# Patient Record
Sex: FEMALE | Race: WHITE | HISPANIC OR LATINO | Employment: UNEMPLOYED | URBAN - METROPOLITAN AREA
[De-identification: names, ages, dates, MRNs, and addresses within clinical notes are randomized per-mention and may not be internally consistent; named-entity substitution may affect disease eponyms.]

---

## 2018-10-22 ENCOUNTER — HOSPITAL ENCOUNTER (EMERGENCY)
Facility: OTHER | Age: 33
Discharge: HOME | End: 2018-10-22
Attending: EMERGENCY MEDICINE
Payer: MEDICAID

## 2018-10-22 VITALS
DIASTOLIC BLOOD PRESSURE: 78 MMHG | HEART RATE: 74 BPM | OXYGEN SATURATION: 98 % | WEIGHT: 151.69 LBS | RESPIRATION RATE: 18 BRPM | TEMPERATURE: 98 F | SYSTOLIC BLOOD PRESSURE: 115 MMHG

## 2018-10-22 DIAGNOSIS — Z34.90 PREGNANCY, UNSPECIFIED GESTATIONAL AGE: Primary | ICD-10-CM

## 2018-10-22 LAB
B-HCG UR QL: POSITIVE
BILIRUB UR QL STRIP: NEGATIVE
CLARITY UR: CLEAR
COLOR UR: YELLOW
CTP QC/QA: YES
GLUCOSE UR QL STRIP: NEGATIVE
HGB UR QL STRIP: ABNORMAL
KETONES UR QL STRIP: NEGATIVE
LEUKOCYTE ESTERASE UR QL STRIP: NEGATIVE
NITRITE UR QL STRIP: NEGATIVE
PH UR STRIP: 7 [PH] (ref 5–8)
PROT UR QL STRIP: NEGATIVE
SP GR UR STRIP: 1.01 (ref 1–1.03)
URN SPEC COLLECT METH UR: ABNORMAL
UROBILINOGEN UR STRIP-ACNC: NEGATIVE EU/DL

## 2018-10-22 PROCEDURE — 99283 EMERGENCY DEPT VISIT LOW MDM: CPT | Mod: 25

## 2018-10-22 PROCEDURE — 81003 URINALYSIS AUTO W/O SCOPE: CPT

## 2018-10-22 PROCEDURE — 81025 URINE PREGNANCY TEST: CPT | Performed by: EMERGENCY MEDICINE

## 2018-10-22 NOTE — DISCHARGE INSTRUCTIONS
Maternal and Fetal Medicine (high risk)   [facility]   MD   [address] [place]  [address]   Ph: [phone]

## 2018-10-22 NOTE — ED PROVIDER NOTES
"Encounter Date: 10/22/2018    SCRIBE #1 NOTE: I, Noemi Valiente, am scribing for, and in the presence of, Dr. Ureña.       History     Chief Complaint   Patient presents with    wellness check      reports that she is 3 months pregnant and "came to make sure the baby is ok." They deny any problems with the pregnancy. She has not seen an OBGYN for this pregnancy and they did not understand that they needed to make an initial appt with OB  for prenatal care.      Time seen by provider: 4:30 PM    This is a 33 y.o. female who presents for wellness check. She reports she is three months pregnant. She reports intermittent leg swelling. She denies abdominal pain, vaginal bleeding, or dysuria. This is her third pregnancy. She has two children that were born in [place]. LMP was . She does not have an appointment scheduled with OB.      The history is provided by the patient and the spouse. The history is limited by a language barrier.     Review of patient's allergies indicates:  No Known Allergies  History reviewed. No pertinent past medical history.  Past Surgical History:   Procedure Laterality Date     SECTION      x2     History reviewed. No pertinent family history.  Social History     Tobacco Use    Smoking status: Never Smoker    Smokeless tobacco: Never Used   Substance Use Topics    Alcohol use: No     Frequency: Never    Drug use: No     Review of Systems   Constitutional: Negative for fever.   HENT: Negative for sore throat.    Respiratory: Negative for shortness of breath.    Cardiovascular: Positive for leg swelling. Negative for chest pain.   Gastrointestinal: Negative for abdominal pain, nausea and vomiting.   Genitourinary: Negative for dysuria and vaginal bleeding.   Musculoskeletal: Negative for back pain.   Skin: Negative for rash.   Neurological: Negative for weakness and headaches.   Hematological: Does not bruise/bleed easily.       Physical Exam     Initial Vitals " [10/22/18 1623]   BP Pulse Resp Temp SpO2   118/77 80 18 98.5 °F (36.9 °C) 98 %      MAP       --         Physical Exam    Nursing note and vitals reviewed.  Constitutional: She appears well-developed and well-nourished. She is not diaphoretic. She is Obese . No distress.   HENT:   Head: Normocephalic and atraumatic.   Eyes: EOM are normal. No scleral icterus.   Neck: Normal range of motion. Neck supple.   Pulmonary/Chest: No respiratory distress.   Abdominal: There is no tenderness.   Gravid uterus.   Musculoskeletal: Normal range of motion. She exhibits no edema or tenderness.   Neurological: She is alert and oriented to person, place, and time.   Skin: Skin is warm and dry.         ED Course   Procedures  Labs Reviewed   URINALYSIS, REFLEX TO URINE CULTURE - Abnormal; Notable for the following components:       Result Value    Occult Blood UA Trace (*)     All other components within normal limits    Narrative:     Preferred Collection Type->Urine, Clean Catch   POCT URINE PREGNANCY - Abnormal; Notable for the following components:    POC Preg Test, Ur Positive (*)     All other components within normal limits          Imaging Results    None          Medical Decision Making:   Initial Assessment:   34 yo F  currently  3 mo pregnant here with desire to establish care. Pt tried to be seen upstairs at OB, and sent to the ED. Pt denies abdominal pain, no vaginal bleeding or discharge. On exam pt has gravid uterus, bedside sono w active fetal activity seen in uterus. + Upreg, given follow up contact infor for OB [facility] clinic and [place] home w prenatal vitamins.  Clinical Tests:   Lab Tests: Ordered and Reviewed            Scribe Attestation:   Scribe #1: I performed the above scribed service and the documentation accurately describes the services I performed. I attest to the accuracy of the note.    Attending Attestation:           Physician Attestation for Scribe:  Physician Attestation Statement for Scribe #1:  I, Dr. Ureña, reviewed documentation, as scribed by Noemi Valiente in my presence, and it is both accurate and complete.                    Clinical Impression:     1. Pregnancy, unspecified gestational age          Disposition:   Disposition: Discharged  Condition: Stable                        Fatmata Ureña MD  10/22/18 9022

## 2018-10-22 NOTE — ED NOTES
MD states she does need LPN to obtain fetal heart tones by doppler. MD states she's assessing fetal movement and fetal heart beat at this time by BS ultrasound.

## 2018-11-06 ENCOUNTER — OFFICE VISIT (OUTPATIENT)
Dept: OBSTETRICS AND GYNECOLOGY | Facility: CLINIC | Age: 33
End: 2018-11-06
Payer: MEDICAID

## 2018-11-06 ENCOUNTER — APPOINTMENT (OUTPATIENT)
Dept: LAB | Facility: HOSPITAL | Age: 33
End: 2018-11-06
Attending: OBSTETRICS & GYNECOLOGY
Payer: MEDICAID

## 2018-11-06 VITALS — SYSTOLIC BLOOD PRESSURE: 104 MMHG | WEIGHT: 152.75 LBS | DIASTOLIC BLOOD PRESSURE: 68 MMHG

## 2018-11-06 DIAGNOSIS — Z3A.14 14 WEEKS GESTATION OF PREGNANCY: Primary | ICD-10-CM

## 2018-11-06 DIAGNOSIS — O09.899 PRIOR PREGNANCY COMPLICATED BY PIH, ANTEPARTUM, UNSPECIFIED TRIMESTER: ICD-10-CM

## 2018-11-06 DIAGNOSIS — Z98.891 HX OF CESAREAN SECTION: ICD-10-CM

## 2018-11-06 LAB
ABO + RH BLD: NORMAL
BASOPHILS # BLD AUTO: 0.01 K/UL
BASOPHILS NFR BLD: 0.1 %
BLD GP AB SCN CELLS X3 SERPL QL: NORMAL
C TRACH DNA SPEC QL NAA+PROBE: NOT DETECTED
DIFFERENTIAL METHOD: ABNORMAL
EOSINOPHIL # BLD AUTO: 0 K/UL
EOSINOPHIL NFR BLD: 0.4 %
ERYTHROCYTE [DISTWIDTH] IN BLOOD BY AUTOMATED COUNT: 13.5 %
HCT VFR BLD AUTO: 39.8 %
HGB BLD-MCNC: 12.8 G/DL
IMM GRANULOCYTES # BLD AUTO: 0.07 K/UL
IMM GRANULOCYTES NFR BLD AUTO: 0.7 %
LYMPHOCYTES # BLD AUTO: 2.5 K/UL
LYMPHOCYTES NFR BLD: 24.3 %
MCH RBC QN AUTO: 29.5 PG
MCHC RBC AUTO-ENTMCNC: 32.2 G/DL
MCV RBC AUTO: 92 FL
MONOCYTES # BLD AUTO: 0.7 K/UL
MONOCYTES NFR BLD: 7.1 %
N GONORRHOEA DNA SPEC QL NAA+PROBE: NOT DETECTED
NEUTROPHILS # BLD AUTO: 6.8 K/UL
NEUTROPHILS NFR BLD: 67.4 %
NRBC BLD-RTO: 0 /100 WBC
PLATELET # BLD AUTO: 274 K/UL
PMV BLD AUTO: 10.8 FL
RBC # BLD AUTO: 4.34 M/UL
WBC # BLD AUTO: 10.1 K/UL

## 2018-11-06 PROCEDURE — 83021 HEMOGLOBIN CHROMOTOGRAPHY: CPT

## 2018-11-06 PROCEDURE — 99213 OFFICE O/P EST LOW 20 MIN: CPT | Mod: TH,S$PBB,, | Performed by: ADVANCED PRACTICE MIDWIFE

## 2018-11-06 PROCEDURE — 86762 RUBELLA ANTIBODY: CPT

## 2018-11-06 PROCEDURE — 86901 BLOOD TYPING SEROLOGIC RH(D): CPT

## 2018-11-06 PROCEDURE — 88175 CYTOPATH C/V AUTO FLUID REDO: CPT

## 2018-11-06 PROCEDURE — 85025 COMPLETE CBC W/AUTO DIFF WBC: CPT

## 2018-11-06 PROCEDURE — 99213 OFFICE O/P EST LOW 20 MIN: CPT | Mod: PBBFAC,TH,PO,25 | Performed by: ADVANCED PRACTICE MIDWIFE

## 2018-11-06 PROCEDURE — 87340 HEPATITIS B SURFACE AG IA: CPT

## 2018-11-06 PROCEDURE — 86703 HIV-1/HIV-2 1 RESULT ANTBDY: CPT

## 2018-11-06 PROCEDURE — 87624 HPV HI-RISK TYP POOLED RSLT: CPT

## 2018-11-06 PROCEDURE — 87491 CHLMYD TRACH DNA AMP PROBE: CPT

## 2018-11-06 PROCEDURE — 86592 SYPHILIS TEST NON-TREP QUAL: CPT

## 2018-11-06 PROCEDURE — 99999 PR PBB SHADOW E&M-EST. PATIENT-LVL III: CPT | Mod: PBBFAC,,, | Performed by: ADVANCED PRACTICE MIDWIFE

## 2018-11-06 NOTE — PROGRESS NOTES
Chief Complaint   Patient presents with    Initial Prenatal Visit     LMP 18       33 y.o., at 14w5d by Estimated Date of Delivery: 19    Complaints today: Per  reports no problems    ROS  OBSTETRICS:   Contractions denies   Bleeding denies   Loss of fluid denies   Fetal mvmnt Reports slight mvmt  GASTRO:   Nausea none   Vomiting none      OB History    Para Term  AB Living   3 2       2   SAB TAB Ectopic Multiple Live Births           2      # Outcome Date GA Lbr Adam/2nd Weight Sex Delivery Anes PTL Lv   3 Current            2 Para            1 Para                   Dating reviewed  Allergies and problem list reviewed and updated  Medical and surgical history reviewed  Prenatal labs reviewed and updated    PHYSICAL EXAM  /68   Wt 69.3 kg (152 lb 12.5 oz)   LMP 2018     GENERAL: No acute distress  NEURO: Alert and oriented x3  PSYCH: Normal mood and affect  PULMONARY: Non-labored respiration  ABDomen: Soft, gravid, nontender    ASSESSMENT AND PLAN    Pregnancy Problems (from 18 to present)     Problem Noted Resolved    Hx of  section 2018 by Laverne Huang CNM No    Priority:  24       Overview Signed 2018  1:24 PM by Laverne Huang CNM     Previous  x 2         Prior pregnancy complicated by PIH, antepartum, unspecified trimester 2018 by Laverne Huang CNM No               labor precautions given  Follow-up:2  weeks

## 2018-11-07 LAB
HBV SURFACE AG SERPL QL IA: NEGATIVE
HIV 1+2 AB+HIV1 P24 AG SERPL QL IA: NEGATIVE
RPR SER QL: NORMAL
RUBV IGG SER-ACNC: 28.2 IU/ML
RUBV IGG SER-IMP: REACTIVE

## 2018-11-08 LAB
HGB A2 MFR BLD HPLC: 2.9 %
HGB FRACT BLD ELPH-IMP: NORMAL
HGB FRACT BLD ELPH-IMP: NORMAL

## 2018-11-09 LAB
HPV HR 12 DNA CVX QL NAA+PROBE: NEGATIVE
HPV16 AG SPEC QL: NEGATIVE
HPV18 DNA SPEC QL NAA+PROBE: NEGATIVE

## 2018-11-26 ENCOUNTER — PROCEDURE VISIT (OUTPATIENT)
Dept: OBSTETRICS AND GYNECOLOGY | Facility: CLINIC | Age: 33
End: 2018-11-26
Payer: MEDICAID

## 2018-11-26 DIAGNOSIS — Z3A.14 14 WEEKS GESTATION OF PREGNANCY: ICD-10-CM

## 2018-11-26 PROCEDURE — 76805 OB US >/= 14 WKS SNGL FETUS: CPT | Mod: PBBFAC | Performed by: OBSTETRICS & GYNECOLOGY

## 2018-11-26 PROCEDURE — 76805 OB US >/= 14 WKS SNGL FETUS: CPT | Mod: 26,S$PBB,, | Performed by: OBSTETRICS & GYNECOLOGY

## 2018-11-26 NOTE — PROCEDURES
A vasquez living IUP is identified. Fetal size is appropriate for established dating. No fetal structural malformations are identified; however, the anatomic survey is incomplete as noted above. The patient will be scheduled for a f/u exam to complete the anatomic survey. Cervical length is normal, and placental location is normal without evidence of previa.

## 2018-11-28 ENCOUNTER — ROUTINE PRENATAL (OUTPATIENT)
Dept: OBSTETRICS AND GYNECOLOGY | Facility: CLINIC | Age: 33
End: 2018-11-28
Payer: MEDICAID

## 2018-11-28 VITALS — DIASTOLIC BLOOD PRESSURE: 62 MMHG | SYSTOLIC BLOOD PRESSURE: 104 MMHG | WEIGHT: 151.69 LBS

## 2018-11-28 DIAGNOSIS — Z3A.17 17 WEEKS GESTATION OF PREGNANCY: Primary | ICD-10-CM

## 2018-11-28 DIAGNOSIS — Z34.82 ENCOUNTER FOR SUPERVISION OF OTHER NORMAL PREGNANCY IN SECOND TRIMESTER: ICD-10-CM

## 2018-11-28 PROCEDURE — 99999 PR PBB SHADOW E&M-EST. PATIENT-LVL III: CPT | Mod: PBBFAC,,, | Performed by: ADVANCED PRACTICE MIDWIFE

## 2018-11-28 PROCEDURE — 99213 OFFICE O/P EST LOW 20 MIN: CPT | Mod: PBBFAC,TH,PO | Performed by: ADVANCED PRACTICE MIDWIFE

## 2018-11-28 PROCEDURE — 99213 OFFICE O/P EST LOW 20 MIN: CPT | Mod: TH,S$PBB,, | Performed by: ADVANCED PRACTICE MIDWIFE

## 2018-11-28 NOTE — PROGRESS NOTES
Chief Complaint   Patient presents with    Routine Prenatal Visit       33 y.o., at 17w6d by Estimated Date of Delivery: 19    Complaints today: Reports occ pedal edema    ROS  OBSTETRICS:   Contractions denies   Bleeding denies   Loss of fluid denies   Fetal mvmnt slight  GASTRO:   Nausea none   Vomiting none      OB History    Para Term  AB Living   3 2       2   SAB TAB Ectopic Multiple Live Births           2      # Outcome Date GA Lbr Adam/2nd Weight Sex Delivery Anes PTL Lv   3 Current            2 Para            1 Para                   Dating reviewed  Allergies and problem list reviewed and updated  Medical and surgical history reviewed  Prenatal labs reviewed and updated    PHYSICAL EXAM  /62   Wt 68.8 kg (151 lb 10.8 oz)   LMP 2018     GENERAL: No acute distress  NEURO: Alert and oriented x3  PSYCH: Normal mood and affect  PULMONARY: Non-labored respiration  ABDomen: Soft, gravid, nontender    ASSESSMENT AND PLAN    Pregnancy Problems (from 18 to present)     Problem Noted Resolved    Hx of  section 2018 by Laverne Huang CNM No    Priority:  24       Overview Signed 2018  1:24 PM by Laverne Huang CNM     Previous  x 2         Prior pregnancy complicated by PIH, antepartum, unspecified trimester 2018 by Laverne Huang CNM No          Discussed normal edema in pregnancy- discussed decrease Na in diet.  Anatomy scan scheduled  Follow-up: 3 weeks

## 2018-12-06 ENCOUNTER — PROCEDURE VISIT (OUTPATIENT)
Dept: MATERNAL FETAL MEDICINE | Facility: CLINIC | Age: 33
End: 2018-12-06
Payer: MEDICAID

## 2018-12-06 DIAGNOSIS — Z36.89 ENCOUNTER FOR FETAL ANATOMIC SURVEY: ICD-10-CM

## 2018-12-06 DIAGNOSIS — Z3A.17 17 WEEKS GESTATION OF PREGNANCY: ICD-10-CM

## 2018-12-06 DIAGNOSIS — O09.522 ELDERLY MULTIGRAVIDA IN SECOND TRIMESTER: ICD-10-CM

## 2018-12-06 PROCEDURE — 76817 TRANSVAGINAL US OBSTETRIC: CPT | Mod: PBBFAC | Performed by: OBSTETRICS & GYNECOLOGY

## 2018-12-06 PROCEDURE — 76811 OB US DETAILED SNGL FETUS: CPT | Mod: 26,S$PBB,, | Performed by: OBSTETRICS & GYNECOLOGY

## 2018-12-06 PROCEDURE — 76817 TRANSVAGINAL US OBSTETRIC: CPT | Mod: 26,S$PBB,, | Performed by: OBSTETRICS & GYNECOLOGY

## 2018-12-06 PROCEDURE — 76811 OB US DETAILED SNGL FETUS: CPT | Mod: PBBFAC | Performed by: OBSTETRICS & GYNECOLOGY

## 2018-12-06 PROCEDURE — 99499 UNLISTED E&M SERVICE: CPT | Mod: S$PBB,,, | Performed by: OBSTETRICS & GYNECOLOGY

## 2018-12-06 NOTE — PROGRESS NOTES
"Lucia  ID #[id number] allison Jones present for Targeted Anatomy scan with Ultrasound Sonographer - Lilia ("Cheryle"), instructed pt to feel free to ask questions during sonography, pt verbalized understanding.  "

## 2018-12-13 DIAGNOSIS — O09.523 ELDERLY MULTIGRAVIDA IN THIRD TRIMESTER: ICD-10-CM

## 2018-12-13 DIAGNOSIS — Z36.89 ENCOUNTER FOR ULTRASOUND TO CHECK FETAL GROWTH: Primary | ICD-10-CM

## 2018-12-18 ENCOUNTER — ROUTINE PRENATAL (OUTPATIENT)
Dept: OBSTETRICS AND GYNECOLOGY | Facility: CLINIC | Age: 33
End: 2018-12-18
Payer: MEDICAID

## 2018-12-18 VITALS — DIASTOLIC BLOOD PRESSURE: 62 MMHG | WEIGHT: 153.88 LBS | SYSTOLIC BLOOD PRESSURE: 107 MMHG

## 2018-12-18 DIAGNOSIS — Z34.82 ENCOUNTER FOR SUPERVISION OF OTHER NORMAL PREGNANCY IN SECOND TRIMESTER: Primary | ICD-10-CM

## 2018-12-18 PROCEDURE — 99999 PR PBB SHADOW E&M-EST. PATIENT-LVL III: CPT | Mod: PBBFAC,,, | Performed by: ADVANCED PRACTICE MIDWIFE

## 2018-12-18 PROCEDURE — 99213 OFFICE O/P EST LOW 20 MIN: CPT | Mod: TH,S$PBB,, | Performed by: ADVANCED PRACTICE MIDWIFE

## 2018-12-18 PROCEDURE — 99213 OFFICE O/P EST LOW 20 MIN: CPT | Mod: PBBFAC,TH,PO | Performed by: ADVANCED PRACTICE MIDWIFE

## 2018-12-18 NOTE — PROGRESS NOTES
Chief Complaint   Patient presents with    Routine Prenatal Visit       33 y.o., at 20w5d by Estimated Date of Delivery: 19    Complaints today: None    ROS  OBSTETRICS:   Contractions denies   Bleeding denies   Loss of fluid denies   Fetal mvmnt Reports some mvmt  GASTRO:   Nausea none   Vomiting none      OB History    Para Term  AB Living   3 2       2   SAB TAB Ectopic Multiple Live Births           2      # Outcome Date GA Lbr Adam/2nd Weight Sex Delivery Anes PTL Lv   3 Current            2 Para            1 Para                   Dating reviewed  Allergies and problem list reviewed and updated  Medical and surgical history reviewed  Prenatal labs reviewed and updated    PHYSICAL EXAM  /62   Wt 69.8 kg (153 lb 14.1 oz)   LMP 2018     GENERAL: No acute distress  NEURO: Alert and oriented x3  PSYCH: Normal mood and affect  PULMONARY: Non-labored respiration  ABDomen: Soft, gravid, nontender    ASSESSMENT AND PLAN    Pregnancy Problems (from 18 to present)     Problem Noted Resolved    Hx of  section 2018 by Laverne Huang CNM No    Priority:  24       Overview Signed 2018  1:24 PM by Laverne Huang CNM     Previous  x 2         Prior pregnancy complicated by PIH, antepartum, unspecified trimester 2018 by Laverne Huang CNM No            Per Swapna- discussed option for Quad screen and pt declines testing   labor precautions given  Follow-up: 4 weeks

## 2019-01-08 ENCOUNTER — PROCEDURE VISIT (OUTPATIENT)
Dept: MATERNAL FETAL MEDICINE | Facility: CLINIC | Age: 34
End: 2019-01-08
Payer: MEDICAID

## 2019-01-08 DIAGNOSIS — Z36.89 ENCOUNTER FOR ULTRASOUND TO ASSESS FETAL GROWTH: ICD-10-CM

## 2019-01-08 PROCEDURE — 76817 TRANSVAGINAL US OBSTETRIC: CPT | Mod: 26,S$PBB,, | Performed by: PEDIATRICS

## 2019-01-08 PROCEDURE — 76817 TRANSVAGINAL US OBSTETRIC: CPT | Mod: PBBFAC,PO | Performed by: PEDIATRICS

## 2019-01-08 PROCEDURE — 76816 OB US FOLLOW-UP PER FETUS: CPT | Mod: PBBFAC,PO | Performed by: PEDIATRICS

## 2019-01-08 PROCEDURE — 99499 UNLISTED E&M SERVICE: CPT | Mod: S$PBB,,, | Performed by: PEDIATRICS

## 2019-01-08 PROCEDURE — 99499 NO LOS: ICD-10-PCS | Mod: S$PBB,,, | Performed by: PEDIATRICS

## 2019-01-08 PROCEDURE — 76816 PR  US,PREGNANT UTERUS,F/U,TRANSABD APP: ICD-10-PCS | Mod: 26,S$PBB,, | Performed by: PEDIATRICS

## 2019-01-08 PROCEDURE — 76816 OB US FOLLOW-UP PER FETUS: CPT | Mod: 26,S$PBB,, | Performed by: PEDIATRICS

## 2019-01-08 PROCEDURE — 76817 PR US, OB, TRANSVAG APPROACH: ICD-10-PCS | Mod: 26,S$PBB,, | Performed by: PEDIATRICS

## 2019-01-09 NOTE — PROGRESS NOTES
Indication  ========    Evaluation of fetal growth/ finish anatomy .    History  ======    General History  Other: MATTHEW LOYA  Risk Factors  Details: AMA    Method  ======    Voluson S8, Transabdominal ultrasound examination. View: Good view.    Pregnancy  =========    Marcial pregnancy. Number of fetuses: 1.    Dating  ======    Cycle: regular cycle  Ultrasound examination on: 1/8/2019  GA by U/S based upon: AC, BPD, Femur, HC  GA by U/S 23 w + 3 d  GEORGINA by U/S: 5/4/2019  Assigned: Dating performed on 11/26/2018, based on the LMP  Assigned GA 23 w + 5 d  Assigned GEORGINA: 5/2/2019    General Evaluation  ==============    Cardiac activity: present.  bpm.  Fetal movements: visualized.  Presentation: breech.  Placenta: anterior.  Amniotic fluid: normal amount.    Fetal Biometry  ============    Fetal Biometry  BPD 56.0 mm 23w 1d Hadlock  OFD 74.8 mm 24w 5d Ollie  .8 mm 23w 0d Hadlock  .8 mm 24w 0d Hadlock  Femur 41.2 mm 23w 3d Hadlock   g 39% Juancarlos  Calculated by: Hadlock (BPD-HC-AC-FL)  EFW (lb) 1 lb  EFW (oz) 6 oz  Cephalic index 0.75  HC / AC 1.09  FL / BPD 0.74  FL / HC 0.20  FL / AC 0.21   bpm    Fetal Anatomy  ============    Cranium: normal  Posterior fossa: normal  Lips: normal  Profile: normal  Nose: normal  4-chamber view: normal  RVOT: normal  LVOT: normal  Aortic arch: normal  Ductal arch: normal  SVC: normal  IVC: normal  3-vessel view: normal  3-vessel-trachea view: normal  Rt lung: normal  Lt lung: normal  Diaphragm: normal  Stomach: normal  Kidneys: normal  Bladder: normal  Sacral spine: normal  Rt forearm: normal  Lt forearm: normal  Other: A full anatomy survey previously performed.          Impression  =========    The fetal anatomic survey was completed today, and no fetal structural abnormalities were noted. Interval fetal growth has been normal, and the  AFV is normal.    F/U as clinically indicated.            Recommendation  ==============    32 week  growth.

## 2019-01-15 ENCOUNTER — APPOINTMENT (OUTPATIENT)
Dept: LAB | Facility: HOSPITAL | Age: 34
End: 2019-01-15
Attending: OBSTETRICS & GYNECOLOGY
Payer: MEDICAID

## 2019-01-15 ENCOUNTER — ROUTINE PRENATAL (OUTPATIENT)
Dept: OBSTETRICS AND GYNECOLOGY | Facility: CLINIC | Age: 34
End: 2019-01-15
Payer: MEDICAID

## 2019-01-15 VITALS — DIASTOLIC BLOOD PRESSURE: 62 MMHG | WEIGHT: 155.19 LBS | SYSTOLIC BLOOD PRESSURE: 100 MMHG

## 2019-01-15 DIAGNOSIS — O99.810 ABNORMAL GLUCOSE TOLERANCE TEST (GTT) DURING PREGNANCY, ANTEPARTUM: Primary | ICD-10-CM

## 2019-01-15 DIAGNOSIS — Z34.83 ENCOUNTER FOR SUPERVISION OF OTHER NORMAL PREGNANCY IN THIRD TRIMESTER: ICD-10-CM

## 2019-01-15 DIAGNOSIS — Z3A.25 25 WEEKS GESTATION OF PREGNANCY: ICD-10-CM

## 2019-01-15 DIAGNOSIS — Z98.891 HX OF CESAREAN SECTION: ICD-10-CM

## 2019-01-15 LAB
BASOPHILS # BLD AUTO: 0.03 K/UL
BASOPHILS NFR BLD: 0.3 %
DIFFERENTIAL METHOD: ABNORMAL
EOSINOPHIL # BLD AUTO: 0.1 K/UL
EOSINOPHIL NFR BLD: 0.8 %
ERYTHROCYTE [DISTWIDTH] IN BLOOD BY AUTOMATED COUNT: 13.5 %
GLUCOSE SERPL-MCNC: 196 MG/DL
HCT VFR BLD AUTO: 39.6 %
HGB BLD-MCNC: 12.3 G/DL
IMM GRANULOCYTES # BLD AUTO: 0.07 K/UL
IMM GRANULOCYTES NFR BLD AUTO: 0.8 %
LYMPHOCYTES # BLD AUTO: 2.2 K/UL
LYMPHOCYTES NFR BLD: 24.5 %
MCH RBC QN AUTO: 29.4 PG
MCHC RBC AUTO-ENTMCNC: 31.1 G/DL
MCV RBC AUTO: 95 FL
MONOCYTES # BLD AUTO: 0.6 K/UL
MONOCYTES NFR BLD: 6.3 %
NEUTROPHILS # BLD AUTO: 6.2 K/UL
NEUTROPHILS NFR BLD: 67.3 %
NRBC BLD-RTO: 0 /100 WBC
PLATELET # BLD AUTO: 280 K/UL
PMV BLD AUTO: 11.3 FL
RBC # BLD AUTO: 4.19 M/UL
WBC # BLD AUTO: 9.12 K/UL

## 2019-01-15 PROCEDURE — 99213 PR OFFICE/OUTPT VISIT, EST, LEVL III, 20-29 MIN: ICD-10-PCS | Mod: TH,,, | Performed by: ADVANCED PRACTICE MIDWIFE

## 2019-01-15 PROCEDURE — 85025 COMPLETE CBC W/AUTO DIFF WBC: CPT

## 2019-01-15 PROCEDURE — 99213 OFFICE O/P EST LOW 20 MIN: CPT | Mod: TH,,, | Performed by: ADVANCED PRACTICE MIDWIFE

## 2019-01-15 PROCEDURE — 90472 IMMUNIZATION ADMIN EACH ADD: CPT | Mod: PBBFAC,PO

## 2019-01-15 PROCEDURE — 99213 OFFICE O/P EST LOW 20 MIN: CPT | Mod: PBBFAC,TH,PO,25 | Performed by: ADVANCED PRACTICE MIDWIFE

## 2019-01-15 PROCEDURE — 99999 PR PBB SHADOW E&M-EST. PATIENT-LVL III: CPT | Mod: PBBFAC,,, | Performed by: ADVANCED PRACTICE MIDWIFE

## 2019-01-15 PROCEDURE — 90686 IIV4 VACC NO PRSV 0.5 ML IM: CPT | Mod: PBBFAC,PO

## 2019-01-15 PROCEDURE — 82950 GLUCOSE TEST: CPT

## 2019-01-15 PROCEDURE — 99999 PR PBB SHADOW E&M-EST. PATIENT-LVL III: ICD-10-PCS | Mod: PBBFAC,,, | Performed by: ADVANCED PRACTICE MIDWIFE

## 2019-01-15 NOTE — PROGRESS NOTES
Chief Complaint   Patient presents with    Routine Prenatal Visit     heartburn       33 y.o., at 24w5d by Estimated Date of Delivery: 19    Complaints today: Reports heartburn    ROS  OBSTETRICS:   Contractions denies   Bleeding denies   Loss of fluid denies   Fetal mvmnt Reports good FM, reinforced BID  GASTRO:   Nausea none   Vomiting none      OB History    Para Term  AB Living   3 2       2   SAB TAB Ectopic Multiple Live Births           2      # Outcome Date GA Lbr Adam/2nd Weight Sex Delivery Anes PTL Lv   3 Current            2 Para            1 Para                   Dating reviewed  Allergies and problem list reviewed and updated  Medical and surgical history reviewed  Prenatal labs reviewed and updated    PHYSICAL EXAM  /62   Wt 70.4 kg (155 lb 3.3 oz)   LMP 2018     GENERAL: No acute distress  NEURO: Alert and oriented x3  PSYCH: Normal mood and affect  PULMONARY: Non-labored respiration  ABDomen: Soft, gravid, nontender    ASSESSMENT AND PLAN    Pregnancy Problems (from 18 to present)     Problem Noted Resolved    Hx of  section 2018 by Laverne Huang CNM No    Priority:  24       Overview Addendum 1/15/2019  9:37 AM by Laverne Huang CNM     Previous  x 2  Desires BTL         Prior pregnancy complicated by PIH, antepartum, unspecified trimester 2018 by Laverne Huang CNM No        Discussed thru  measures to alleviate heartburn, advised OTC Pepcid    DM screen done today  TDAP / flu vaccine done today  Follow-up: 4 weeks

## 2019-01-16 ENCOUNTER — TELEPHONE (OUTPATIENT)
Dept: OBSTETRICS AND GYNECOLOGY | Facility: CLINIC | Age: 34
End: 2019-01-16

## 2019-01-16 NOTE — TELEPHONE ENCOUNTER
Swapna spoke with pt per phone- advised of abnormal DM screen and need for 3hr GTT. Appt scheduled for 01/20/19 at 0830. Instructions given.

## 2019-01-21 ENCOUNTER — APPOINTMENT (OUTPATIENT)
Dept: LAB | Facility: HOSPITAL | Age: 34
End: 2019-01-21
Attending: OBSTETRICS & GYNECOLOGY
Payer: MEDICAID

## 2019-01-21 LAB
GLUCOSE SERPL-MCNC: 109 MG/DL
GLUCOSE SERPL-MCNC: 132 MG/DL
GLUCOSE SERPL-MCNC: 196 MG/DL
GLUCOSE SERPL-MCNC: 220 MG/DL

## 2019-01-21 PROCEDURE — 82951 GLUCOSE TOLERANCE TEST (GTT): CPT

## 2019-01-21 PROCEDURE — 82952 GTT-ADDED SAMPLES: CPT

## 2019-01-23 ENCOUNTER — TELEPHONE (OUTPATIENT)
Dept: OBSTETRICS AND GYNECOLOGY | Facility: CLINIC | Age: 34
End: 2019-01-23

## 2019-01-23 DIAGNOSIS — O24.419 GESTATIONAL DIABETES MELLITUS (GDM) IN THIRD TRIMESTER, GESTATIONAL DIABETES METHOD OF CONTROL UNSPECIFIED: Primary | ICD-10-CM

## 2019-01-23 DIAGNOSIS — O24.419 GESTATIONAL DIABETES MELLITUS (GDM) IN SECOND TRIMESTER, GESTATIONAL DIABETES METHOD OF CONTROL UNSPECIFIED: Primary | ICD-10-CM

## 2019-01-23 DIAGNOSIS — O24.419 GESTATIONAL DIABETES MELLITUS (GDM) IN SECOND TRIMESTER, GESTATIONAL DIABETES METHOD OF CONTROL UNSPECIFIED: ICD-10-CM

## 2019-01-23 RX ORDER — LANCETS
EACH MISCELLANEOUS
Qty: 200 EACH | Refills: 1 | Status: ON HOLD | OUTPATIENT
Start: 2019-01-23 | End: 2019-04-30 | Stop reason: HOSPADM

## 2019-01-23 RX ORDER — LANCETS
EACH MISCELLANEOUS
Qty: 200 EACH | Refills: 1 | Status: SHIPPED | OUTPATIENT
Start: 2019-01-23 | End: 2019-01-23 | Stop reason: CLARIF

## 2019-01-23 RX ORDER — DEXTROSE 4 G
TABLET,CHEWABLE ORAL
Qty: 1 EACH | Refills: 0 | Status: ON HOLD | OUTPATIENT
Start: 2019-01-23 | End: 2019-04-30 | Stop reason: HOSPADM

## 2019-01-23 NOTE — TELEPHONE ENCOUNTER
Swapna Cuevas MA- [facility] spoke with pt per phone- advised of abnormal 3hr GTT and need for glucometer supplies and diabetic teaching. Advised rx for supplies sent to [facility] and that consult to diabetic education has been placed and that they will contact her to set up appt for teaching. Pt has follow up appt with me and will review her glucose values with MD and determine if needs for meds.

## 2019-02-05 ENCOUNTER — PROCEDURE VISIT (OUTPATIENT)
Dept: MATERNAL FETAL MEDICINE | Facility: CLINIC | Age: 34
End: 2019-02-05
Payer: MEDICAID

## 2019-02-05 DIAGNOSIS — O24.419 GESTATIONAL DIABETES MELLITUS (GDM) IN SECOND TRIMESTER, GESTATIONAL DIABETES METHOD OF CONTROL UNSPECIFIED: ICD-10-CM

## 2019-02-05 DIAGNOSIS — Z36.89 ENCOUNTER FOR ULTRASOUND TO ASSESS FETAL GROWTH: ICD-10-CM

## 2019-02-05 PROCEDURE — 99213 PR OFFICE/OUTPT VISIT, EST, LEVL III, 20-29 MIN: ICD-10-PCS | Mod: S$PBB,TH,25, | Performed by: OBSTETRICS & GYNECOLOGY

## 2019-02-05 PROCEDURE — 76816 PR  US,PREGNANT UTERUS,F/U,TRANSABD APP: ICD-10-PCS | Mod: 26,S$PBB,, | Performed by: OBSTETRICS & GYNECOLOGY

## 2019-02-05 PROCEDURE — 76816 OB US FOLLOW-UP PER FETUS: CPT | Mod: PBBFAC,PO | Performed by: OBSTETRICS & GYNECOLOGY

## 2019-02-05 PROCEDURE — 76816 OB US FOLLOW-UP PER FETUS: CPT | Mod: 26,S$PBB,, | Performed by: OBSTETRICS & GYNECOLOGY

## 2019-02-05 PROCEDURE — 99213 OFFICE O/P EST LOW 20 MIN: CPT | Mod: S$PBB,TH,25, | Performed by: OBSTETRICS & GYNECOLOGY

## 2019-02-05 NOTE — PROGRESS NOTES
Indication  ========    Evaluation of fetal growth.    History  ======    General History  Other: MATTHEW LOYA  Risk Factors  Details: AMA    Method  ======    Transabdominal ultrasound examination, Voluson S8. View: Good view.    Pregnancy  =========    Marcial pregnancy. Number of fetuses: 1.    Dating  ======    Cycle: regular cycle  Ultrasound examination on: 2019  GA by U/S based upon: AC, BPD, Femur, HC  GA by U/S 27 w + 4 d  GEORGINA by U/S: 5/3/2019  Assigned: Dating performed on 2018, based on the LMP  Assigned GA 27 w + 5 d  Assigned GEORGINA: 2019    General Evaluation  ==============    Cardiac activity: present.  bpm.  Fetal movements: visualized.  Presentation: cephalic.  Placenta: anterior.  Amniotic fluid: MVP 6.9 cm.    Fetal Biometry  ============    Fetal Biometry  BPD 66.9 mm 27w 0d Hadlock  OFD 88.8 mm 28w 4d Ollie  .0 mm 27w 1d Hadlock  .0 mm 28w 2d Hadlock  Femur 52.4 mm 27w 6d Hadlock  EFW 1,152 g 50% Juancarlos  Calculated by: Hadlock (BPD-HC-AC-FL)  EFW (lb) 2 lb  EFW (oz) 9 oz  Cephalic index 0.75  HC / AC 1.04  FL / BPD 0.78  FL / HC 0.21  FL / AC 0.22  MVP 6.9 cm   bpm    Fetal Anatomy  ===========    Cranium: normal  Stomach: normal  Kidneys: normal  Bladder: normal  Other: A full anatomy survey previously performed.    Consultation  ==========    Gestational diabetes is marked by carbohydrate intolerance in pregnancy. Gestational diabetes mellitus (GDM) is defined as insulin  resistance of variable severity with onset or first recognition during pregnancy. It complicates 6-9% of all pregnancies (using current  definitions)  and is increasing as the prevalence of obesity continues to rise. Appropriate diagnosis and treatment of women with GDM reduces fetal  macrosomia, preeclampsia, gestational hypertension,  delivery, and composite  morbidity (shoulder dystocia, nerve palsy,  and bone fracture). Neonates of women with GDM are also at  increased risk for  hypoglycemia and hyperbilirubinemia as well as  obesity and diabetes in adolescence and adulthood. Women diagnosed with GDM are at increased risk of developing Type 2 diabetes mellitus  later in life.    I counseled the patient regarding the risks of gestational diabetes, which include macrosomia, polyhydramnios,  hypoglycemia, birth  trauma, an increased risk of  delivery, decreased lung maturity, and jaundice. Patients requiring hypoglycemia agents have an  increased risk of stillbirth. She understands that  outcome is linked to her ability to achieve glycemic control.    The goal of treatment is to have a fasting blood sugar less than 95 mg/dL and 2 hour postprandials less than 120 mg/dL. Insulin is the ideal  agent for treatment of GDM. The basis for this has focused on a lack of data on long-term outcomes of maternal and childhood outcomes when  women are treated with oral agents in pregnancy. However, there are no data demonstrating adverse short-term adverse maternal or   effects from oral diabetic agents in pregnancy If oral agents are used, our division utilizes metformin as the first line oral hypoglycemic agent.    Metformin inhibits hepatic gluconeogenesis and glucose absorption while also stimulating peripheral glucose uptake. Metformin crosses the  placenta and fetal metformin concentrations approach those of the mother. While there are not long-term outcome data on metformin use in  pregnancy, at least one study has demonstrated similar 2-year developmental outcomes when compared to insulin (Raven et al). There does  not appear to be any differences in short-term  outcomes for neonates whose mothers were treated with metformin compared to  insulin. However, there does appear to be a reduction in the rate of hypertensive disease, but a slight increase in the rate of  birth  (Francisca et al). Between 26 - 46% of women treated  with metformin will ultimately require insulin for glycemic control.    Glyburide is a second-generation sulfonylurea, which works by stimulating the pancreas to release more insulin. This medication is  contraindicated in those with a sulfa allergy. The major side effect of glyburide is therefore hypoglycemia. Two recent meta-analyses (Shubham  et al; Francisca et al) have demonstrated worse  outcomes in women treated with glyburide compared to insulin with an increase in  respiratory morbidity, macrosomia, birth injury and hypoglycemia. This is thought to be related to the fact that glyburide crosses the placenta  and stimulates fetal insulin release. In clinical trials, 4-16 % of women fail glyburide and ultimately require insulin. Given the recent concerns  regarding the use of glyburide for treatment of GDM, we recommend that glyburide use should be reserved only for those women who decline  insulin and who are intolerant of metformin.    Antepartum testing is indicated for those patients on hypoglycemia agents to reduce the incidence of fetal demise. Fetal growth should be  followed with a scan between 37 and 38 weeks to evaluate for fetal macrosomia and delivery planning.  Women diagnosed with GDM are at increased risk of developing Type 2 diabetes mellitus later in life. Postpartum glucose testing using a 75  g  oral glucose tolerance test should be performed at 6-12 weeks after delivery.      Recommendations:  1. The patient has not yet met with the dietician.  2. She has not started to check her blood sugars and plans to start after she meets with the diabetic educator. She will see Laverne Huang  following this. [facility] has scheduled a follow-up in 4 weeks but are available earlier if needed. She understands that the goal of therapy as to  achieve a fasting blood sugar less than 95 and 2 hour postprandials  less than 120.  3. A scan should be performed at 32 and 37-38 weeks gestation to exclude macrosomia  and determine further delivery management.  4. If medication is needed,  fetal surveillance is recommended at 32 weeks with delivery between 39 and 40 weeks if well controlled. If  other comorbidities or poor control, delivery is recommended between 38 and 39 weeks. If the patient is diet controlled without comorbidities,   fetal surveillance is recommended at 40 weeks with delivery at 40-41 weeks gestation.  5. If EFW is 4500g or greater, recommend offering csection for delivery.  6. Patients with GDM are cured by delivery. All medications can be stopped post-partum. However, some patients with GDM have undiagnosed  Type 2 diabetes. Therefore, a single post-partum fasting blood sugar can be used to screen patients for undiagnosed Type 2 diabetes prior to  discharge. If > 126 mg/dL, continue monitoring patterned blood sugars and treat as needed.  7. Patients with GDM should be screened for Type 2 DM at 6-8 weeks after delivery with a 75-g, 2 hour glucose tolerance test. This testing  should be scheduled when the patient returns for her post-partum exam or the patient should be instructed to follow-up with her primary care  physician. For women who are exclusively breastfeeding, it is reasonable to wait until they are done lactating prior to performing screening.  They also require life-long monitoring for Type 2 diabetes and should be instructed on the importance of continued care with their primary care  physician for periodic retesting and initiation of lifestyle modification with diet and exercise.    Certified  present.  Time  I overall spent approximately 15 minutes in face to face time with the patient and her family, greater than 50% of which was in counseling and  care coordination.    Impression  =========    Fetal size is AGA with the EFW at the 50th percentile.  Normal repeat limited fetal anatomic survey. AFV is normal.    Recommendation  ==============    Recommend follow-up  fetal growth ultrasound, BPP, and office visit to review blood sugars in 4 weeks.  Please contact [facility] for an appointment earlier if assistance is needed with medications or diabetes control.

## 2019-02-09 ENCOUNTER — HOSPITAL ENCOUNTER (EMERGENCY)
Facility: HOSPITAL | Age: 34
Discharge: HOME | End: 2019-02-09
Attending: EMERGENCY MEDICINE
Payer: MEDICAID

## 2019-02-09 VITALS
HEART RATE: 77 BPM | BODY MASS INDEX: 30.82 KG/M2 | WEIGHT: 157 LBS | SYSTOLIC BLOOD PRESSURE: 101 MMHG | TEMPERATURE: 99 F | HEIGHT: 60 IN | RESPIRATION RATE: 18 BRPM | DIASTOLIC BLOOD PRESSURE: 65 MMHG | OXYGEN SATURATION: 98 %

## 2019-02-09 DIAGNOSIS — Z3A.28 28 WEEKS GESTATION OF PREGNANCY: ICD-10-CM

## 2019-02-09 DIAGNOSIS — J06.9 URI WITH COUGH AND CONGESTION: Primary | ICD-10-CM

## 2019-02-09 LAB
CTP QC/QA: YES
POC MOLECULAR INFLUENZA A AGN: NEGATIVE
POC MOLECULAR INFLUENZA B AGN: NEGATIVE
POCT GLUCOSE: 77 MG/DL (ref 70–110)

## 2019-02-09 PROCEDURE — 99284 PR EMERGENCY DEPT VISIT,LEVEL IV: ICD-10-PCS | Mod: ,,, | Performed by: PHYSICIAN ASSISTANT

## 2019-02-09 PROCEDURE — 82962 GLUCOSE BLOOD TEST: CPT

## 2019-02-09 PROCEDURE — 99283 EMERGENCY DEPT VISIT LOW MDM: CPT | Mod: 25

## 2019-02-09 PROCEDURE — 99284 EMERGENCY DEPT VISIT MOD MDM: CPT | Mod: ,,, | Performed by: PHYSICIAN ASSISTANT

## 2019-02-09 RX ORDER — PROMETHAZINE HYDROCHLORIDE AND DEXTROMETHORPHAN HYDROBROMIDE 6.25; 15 MG/5ML; MG/5ML
SYRUP ORAL
Qty: 60 ML | Refills: 0 | Status: SHIPPED | OUTPATIENT
Start: 2019-02-09 | End: 2019-03-11

## 2019-02-09 NOTE — ED PROVIDER NOTES
Encounter Date: 2019       History     Chief Complaint   Patient presents with    URI     cough,ear hurts     The patient is a  who is currently 28 weeks pregnant. She speaks Mongolian primarily, but her family is able to translate. Offered , but pt refused and declined Pernell, states that she is comfortable with her family translating. She has had pre- care during this gestation. Her last OB appointment was 4 days ago. She had an US and reports no complications.     She presents to the ER today c/o cold and flu symptoms. She reports cough, sinus congestion, nasal drainage, and sore throat. She reports chills, but denies any fever. She states that her  and her son have both had similar illnesses earlier this week, but are better, and now she thinks she has the same bug. They were not tested for flu or treated by medical professionals. She has taken Tylenol at home, but otherwise denies any pre-arrival treatment. She denies any abdominal pain, pelvic pain, or vaginal bleeding. She denies any N/V/D.           Review of patient's allergies indicates:   Allergen Reactions    Lactose Hives     Past Medical History:   Diagnosis Date    Gestational diabetes     Uses Mongolian as primary spoken language      Past Surgical History:   Procedure Laterality Date     SECTION      x2     Family History   Problem Relation Age of Onset    Breast cancer Neg Hx     Colon cancer Neg Hx     Ovarian cancer Neg Hx      Social History     Tobacco Use    Smoking status: Never Smoker    Smokeless tobacco: Never Used   Substance Use Topics    Alcohol use: No     Frequency: Never    Drug use: No     Review of Systems   Constitutional: Positive for chills. Negative for activity change and fever.   HENT: Positive for congestion, rhinorrhea, sneezing and sore throat. Negative for drooling, ear pain, facial swelling, trouble swallowing and voice change.    Eyes: Negative for discharge, redness and  itching.   Respiratory: Positive for cough. Negative for shortness of breath, wheezing and stridor.    Cardiovascular: Negative for chest pain and palpitations.   Gastrointestinal: Negative for abdominal pain, diarrhea, nausea and vomiting.   Genitourinary: Negative for difficulty urinating, dysuria, flank pain, frequency, pelvic pain, urgency, vaginal bleeding and vaginal discharge.   Musculoskeletal: Negative for back pain, gait problem, myalgias and neck pain.   Skin: Negative for rash.   Neurological: Negative for dizziness, seizures, syncope, weakness, light-headedness and headaches.   Psychiatric/Behavioral: Negative for confusion.       Physical Exam     Initial Vitals [02/09/19 1045]   BP Pulse Resp Temp SpO2   (!) 98/55 80 18 98.4 °F (36.9 °C) 97 %      MAP       --         Physical Exam    Nursing note and vitals reviewed.  Constitutional: She appears well-developed and well-nourished. She is not diaphoretic.   Alert and ambulatory. Non-toxic appearance.    HENT:   Head: Normocephalic.   Swollen nasal mucosa. Rhinorrhea. Nasal sinus congestion. Bilateral TM bulging w/o erythema. Clear oropharynx w/o swelling, edema, erythema, or exudate. No adenopathy.    Eyes: Conjunctivae are normal. Right eye exhibits no discharge. Left eye exhibits no discharge. No scleral icterus.   Neck: Neck supple.   Cardiovascular: Normal rate and intact distal pulses.   Pulmonary/Chest: No respiratory distress. She has no wheezes. She has no rhonchi. She has no rales.   Occasional mild cough. No tachypnea or hypoxia.    Abdominal: Soft.   Non-tender. Age appropriate fundal height.    Musculoskeletal: Normal range of motion.   No calf pain or swelling.    Neurological: She is alert and oriented to person, place, and time. She has normal strength. No sensory deficit.   Normal gait. No focal deficit.    Skin: Skin is warm and dry. No rash noted.   Psychiatric: She has a normal mood and affect.         ED Course   Procedures  Labs  Reviewed   POCT INFLUENZA A/B MOLECULAR   POCT GLUCOSE     Results for orders placed or performed during the hospital encounter of 19   POCT Influenza A/B Molecular   Result Value Ref Range    POC Molecular Influenza A Ag Negative Negative, Not Reported    POC Molecular Influenza B Ag Negative Negative, Not Reported     Acceptable Yes    POCT glucose   Result Value Ref Range    POCT Glucose 77 70 - 110 mg/dL            Imaging Results    None          Medical Decision Making:   Initial Assessment:    who is currently 28 weeks pregnant, who presents to the ER c/o acute cold and flu symptoms for the past 3 days. Family members with similar symptoms this week. Denies any abdominal/pelvic pain or vaginal bleeding. US earlier this week.   Differential Diagnosis:   Influenza, URI, Pneumonia, Pregnancy complication, etc.   Clinical Tests:   Lab Tests: Ordered and Reviewed  ED Management:  Influenza screen negative   Accucheck reviewed due to hx of gestational DM   Fetal heart tones obtained  Advised Tylenol as directed as needed for any fever or aches    Advised close follow up with OB/GYN  Advised prompt return to the ER if worse in any way   I discussed the case in detail with the ER attending physician               Attending Attestation:     Physician Attestation Statement for NP/PA:   I discussed this assessment and plan of this patient with the NP/PA, but I did not personally examine the patient. The face to face encounter was performed by the NP/PA.                     Clinical Impression:   The primary encounter diagnosis was URI with cough and congestion. A diagnosis of 28 weeks gestation of pregnancy was also pertinent to this visit.      Disposition:   Disposition: Discharged  Condition: Stable                        Tomasz Trejo PA-C  19 [id number]       Ethan Diamond MD  19 [id number]

## 2019-02-09 NOTE — ED TRIAGE NOTES
Patient in from home for eval of non-productive cough, headache, and L ear pain. Patient symptoms have been ongoing for 3 days and have not improved. Reports chest soreness with cough. Denies any fever, chills, abdominal pain, swelling, nausea or vomiting.

## 2019-02-11 ENCOUNTER — HOSPITAL ENCOUNTER (OUTPATIENT)
Dept: DIABETES | Facility: OTHER | Age: 34
Discharge: HOME | End: 2019-02-11
Attending: ADVANCED PRACTICE MIDWIFE
Payer: MEDICAID

## 2019-02-11 ENCOUNTER — ROUTINE PRENATAL (OUTPATIENT)
Dept: OBSTETRICS AND GYNECOLOGY | Facility: CLINIC | Age: 34
End: 2019-02-11
Payer: MEDICAID

## 2019-02-11 VITALS — SYSTOLIC BLOOD PRESSURE: 108 MMHG | DIASTOLIC BLOOD PRESSURE: 70 MMHG | BODY MASS INDEX: 30.27 KG/M2 | WEIGHT: 155 LBS

## 2019-02-11 DIAGNOSIS — O24.410 DIET CONTROLLED GESTATIONAL DIABETES MELLITUS (GDM), ANTEPARTUM: Primary | ICD-10-CM

## 2019-02-11 DIAGNOSIS — Z30.2 ENCOUNTER FOR STERILIZATION: ICD-10-CM

## 2019-02-11 DIAGNOSIS — J11.1 FLU: ICD-10-CM

## 2019-02-11 DIAGNOSIS — O09.293 PREGNANCY WITH POOR REPRODUCTIVE HISTORY IN THIRD TRIMESTER: Primary | ICD-10-CM

## 2019-02-11 DIAGNOSIS — O24.410 DIET CONTROLLED GESTATIONAL DIABETES MELLITUS (GDM) IN THIRD TRIMESTER: ICD-10-CM

## 2019-02-11 PROBLEM — O34.219 MATERNAL CARE FOR SCAR FROM PREVIOUS CESAREAN DELIVERY: Status: ACTIVE | Noted: 2018-11-06

## 2019-02-11 PROBLEM — O09.299 PREGNANCY WITH POOR REPRODUCTIVE HISTORY, ANTEPARTUM: Status: ACTIVE | Noted: 2018-11-06

## 2019-02-11 PROCEDURE — 99999 PR PBB SHADOW E&M-EST. PATIENT-LVL II: ICD-10-PCS | Mod: PBBFAC,,, | Performed by: OBSTETRICS & GYNECOLOGY

## 2019-02-11 PROCEDURE — 99999 PR PBB SHADOW E&M-EST. PATIENT-LVL II: CPT | Mod: PBBFAC,,, | Performed by: OBSTETRICS & GYNECOLOGY

## 2019-02-11 PROCEDURE — 99213 OFFICE O/P EST LOW 20 MIN: CPT | Mod: TH,S$PBB,, | Performed by: OBSTETRICS & GYNECOLOGY

## 2019-02-11 PROCEDURE — G0108 DIAB MANAGE TRN  PER INDIV: HCPCS | Performed by: DIETITIAN, REGISTERED

## 2019-02-11 PROCEDURE — 99213 PR OFFICE/OUTPT VISIT, EST, LEVL III, 20-29 MIN: ICD-10-PCS | Mod: TH,S$PBB,, | Performed by: OBSTETRICS & GYNECOLOGY

## 2019-02-11 PROCEDURE — 99212 OFFICE O/P EST SF 10 MIN: CPT | Mod: PBBFAC,TH,PO | Performed by: OBSTETRICS & GYNECOLOGY

## 2019-02-11 RX ORDER — OSELTAMIVIR PHOSPHATE 75 MG/1
75 CAPSULE ORAL 2 TIMES DAILY
Qty: 10 CAPSULE | Refills: 0 | Status: SHIPPED | OUTPATIENT
Start: 2019-02-11 | End: 2019-02-16

## 2019-02-11 NOTE — PROGRESS NOTES
Chief Complaint   Patient presents with    Routine Prenatal Visit     pt complains of headcache, chest pain and earaches       33 y.o., at 28w4d by Estimated Date of Delivery: 19    Complaints today: Cough resulting in chest pain, congestion, muscle aches, and fever for the past couple days.  Her children had similar symptoms last week.  Seen in the ED this weekend for these complaints.  Flu swab was negative.  Pt discharged home with cough medicine.    ROS  OBSTETRICS:   Contractions N   Bleeding N   Loss of fluid N   Fetal mvmnt Y  GASTRO:   Nausea N   Vomiting N      OB History    Para Term  AB Living   3 2       2   SAB TAB Ectopic Multiple Live Births           2      # Outcome Date GA Lbr Adam/2nd Weight Sex Delivery Anes PTL Lv   3 Current            2 Para            1 Para                   Dating reviewed  Allergies and problem list reviewed and updated  Medical and surgical history reviewed  Prenatal labs reviewed and updated    PHYSICAL EXAM  /70   Wt 70.3 kg (154 lb 15.7 oz)   LMP 2018   BMI 30.27 kg/m²     GENERAL: No acute distress  NEURO: Alert and oriented x3  PSYCH: Normal mood and affect  PULMONARY: Non-labored respiration  ABDomen: Soft, gravid, nontender    ASSESSMENT AND PLAN    Pregnancy Problems (from 18 to present)     Problem Noted Resolved    Encounter for sterilization 2019 by HAYDE Sr MD No    Diet controlled gestational diabetes mellitus (GDM), antepartum 2019 by Laverne Huang CNM No    Maternal care for scar from previous  delivery 2018 by Laverne Huang CNM No    Overview Addendum 1/15/2019  9:37 AM by Laverne Huang CNM     Previous  x 2  Desires BTL         Pregnancy with poor reproductive history, antepartum 2018 by Laverne Huang CNM No    Overview Addendum 2019  3:09 PM by HADYE Sr MD     History of gHTN in prior pregnancy.    Dating - By LMP consistent with 17 week MFM u/s.  U/S  - Normal anatomy.  2019: vtx, 50%ile.  Aneuploidy screening - Declined.  Vaccines - 1/15/2019: flu vaccine and Tdap.  Contraception -  2019: Medicaid sterilization consents signed.  Pap - 2018: NILM/HPV(-).               PNL - Up to date.  gDM - Pt met with diabetic education today.  Counseled patient on timing for pattern blood sugar.  Return to clinic in 1 week to review blood sugars.  Flu - Pt with cough, congestion, fever, and muscle aches.  Will treat empirically with tamiflu despite negative swab in ED.  Patient given strict breathing and N/V precautions.  Sterilization - Desires BTL for contraception.  Pt counseled on permanence, risk of failure, and ectopic pregnancy.  Medicaid sterilization consents signed today.     labor precautions given  Follow-up: 1 week

## 2019-02-12 VITALS — WEIGHT: 154.88 LBS | BODY MASS INDEX: 30.25 KG/M2

## 2019-02-12 NOTE — PROGRESS NOTES
Diabetes Education  Author: Sahra Ring RD  Date: 2/12/2019    Diabetes Care Management Summary  Diabetes Education Record Assessment/Progress: Initial         Diabetes Type  Diabetes Type : Gestational    Diabetes History  Diabetes Diagnosis: 0-1 year  Current Treatment: Diet    Health Maintenance was reviewed today with patient. Discussed with patient importance of routine eye exams, foot exams/foot care, blood work (i.e.: A1c, microalbumin, and lipid), dental visits, yearly flu vaccine, and pneumonia vaccine as indicated by PCP. Patient verbalized understanding.     Health Maintenance Topics with due status: Not Due       Topic Last Completion Date    Pap Smear with HPV Cotest 11/06/2018    TETANUS VACCINE 01/15/2019     Health Maintenance Due   Topic Date Due    Lipid Panel  1985       Nutrition  Meal Planning: 3 meals per day, water, snacks between meal, eats out seldom(Has been sick for a few days and has a poor appetite, does not like sweets. Eats beef, shrimp and cheese.)  What type of sweetener do you use?: honey  What type of beverages do you drink?: water, juice(orange juice twice a day)  Meal Plan 24 Hour Recall - Breakfast: 2 tortillas and coffee black, usually eats 2 tortillas with cheese  Meal Plan 24 Hour Recall - Lunch: cup of stawberries and broccoli with water, usually eats a salad with cucumber, broccoli, beans and sometimes chicken  Meal Plan 24 Hour Recall - Dinner: french fries and hot tea with honey, beef soup with a lot of vegetables  Meal Plan 24 Hour Recall - Snack: fresh fruit for snacks(orange and banana, stawberries) corn on the cob    Monitoring   Monitoring: Accu-check Tracey Smart View  Blood Glucose Logs: No  Do you use a personal continuous glucose monitor?: No  In the last month, how often have you had a low blood sugar reaction?: never  Can you tell when your blood sugar is too high?: no    Exercise   Exercise Type: walking  Intensity: Moderate  Frequency: 3-5 Times  per week  Duration: 30 min    Current Diabetes Treatment   Current Treatment: Diet    Social History  Preferred Learning Method: Face to Face  Primary Support: Spouse  Educational Level: Grade School  Occupation: house keeping   Smoking Status: Never a Smoker  Alcohol Use: Never                                Barriers to Change  Barriers to Change: None  Learning Challenges : Language  Language spoken: Croatian, live  present  Language -  present?: Yes    Readiness to Learn   Readiness to Learn : Acceptance    Cultural Influences  Cultural Influences: No    Diabetes Education Assessment/Progress  Diabetes Disease Process (diabetes disease process and treatment options): Discussion, Instructed, Needs Instruction, Individual Session, Written Materials Provided(Ed on IR and insulins role in controling BG)    Nutrition (Incorporating nutritional management into one's lifestyle): Discussion, Demonstration, Return Demonstration, Instructed, Needs Instruction, Written Materials Provided, Individual Session(She is not able to eat much because she has flu symptoms. Ed on carb counting, label reading and meal and snack planning. )    Physical Activity (incorporating physical activity into one's lifestyle): Discussion, Instructed, Needs Instruction, Individual Session, Written Materials Provided(Walks for exercise. Ed on benefits and precautions to take when exercising during pregnancy. )    Medications (states correct name, dose, onset, peak, duration, side effects & timing of meds): Discussion, Instructed, Individual Session, Needs Review(Diet controlled. Ed on medication options. )    Monitoring (monitoring blood glucose/other parameters & using results): Discussion, Demonstration, Return Demonstration, Instructed, Needs Instruction, Individual Session, Written Materials Provided(Pt presents with a Accu Check meter. Demonstrated technique with teach back. Pt demonstrated ability to SMBG. BG was 121  before lunch. Ed on SMBG 4 times daily, target BG ranges,proper sharps disposal & bring logs to all clinic apmts.)    Acute Complications (preventing, detecting, and treating acute complications): Discussion, Instructed, Demonstration, Needs Instruction, Individual Session, Written Materials Provided(Ed on causes, s/s and Tx for hypo and hyperglycemia. Kings any recent hypoglycemia. )    Chronic Complications (preventing, detecting, and treating chronic complications): Discussion, Instructed, Needs Instruction, Individual Session, Written Materials Provided(Ed on tight BG control to limit/prevent complications during and after pregnancy.)    Clinical (diabetes, other pertinent medical history, and relevant comorbidities reviewed during visit): Discussion, Instructed, Needs Instruction, Individual Session, Written Materials Provided(Review GTT results vs goal ranges)    Cognitive (knowledge of self-management skills, functional health literacy): Discussion, Instructed, Needs Instruction, Individual Session, Written Materials Provided    Psychosocial (emotional response to diabetes): Discussion, Instructed, Needs Review, Individual Session, Written Materials Provided    Diabetes Distress and Support Systems: Discussion, Instructed, Needs Review, Individual Session, Written Materials Provided    Behavioral (readiness for change, lifestyle practices, self-care behaviors): Discussion, Instructed, Needs Instruction, Individual Session, Written Materials Provided(Pt is eagar to have a healthy pregnancy)    Goals  Patient has selected/evaluated goals during today's session: Yes, selected  Healthy Eating: Set(Eat 30-45 g of carbs at breakfast and 45-60g at lunch and dinner)  Start Date: 02/11/19  Target Date: 02/25/19  Monitoring: Set(SMBG 4 times daily and bring logs to all clinic apmtns)  Start Date: 02/11/19  Target Date: 02/25/19         Diabetes Care Plan/Intervention  Education Plan/Intervention: Other(f/u with OBGYN  with logs)    Diabetes Meal Plan  Calories: 2200  Carbohydrate Per Meal: 30-45g, 45-60g  Carbohydrate Per Snack : 15-30g  Fat: 88-97g  Protein: 88=99g    Today's Self-Management Care Plan was developed with the patient's input and is based on barriers identified during today's assessment.    The long and short-term goals in the care plan were written with the patient/caregiver's input. The patient has agreed to work toward these goals to improve her overall diabetes control.      The patient received a copy of today's self-management plan and verbalized understanding of the care plan, goals, and all of today's instructions.      The patient was encouraged to communicate with her physician and care team regarding her condition(s) and treatment.  I provided the patient with my contact information today and encouraged her to contact me via phone or patient portal as needed.     Education Units of Time   Time Spent: 60 min

## 2019-02-25 ENCOUNTER — ROUTINE PRENATAL (OUTPATIENT)
Dept: OBSTETRICS AND GYNECOLOGY | Facility: CLINIC | Age: 34
End: 2019-02-25
Payer: MEDICAID

## 2019-02-25 VITALS
SYSTOLIC BLOOD PRESSURE: 110 MMHG | WEIGHT: 156.88 LBS | BODY MASS INDEX: 30.63 KG/M2 | DIASTOLIC BLOOD PRESSURE: 82 MMHG

## 2019-02-25 DIAGNOSIS — O09.299 PREGNANCY WITH POOR REPRODUCTIVE HISTORY, ANTEPARTUM: ICD-10-CM

## 2019-02-25 DIAGNOSIS — O24.410 DIET CONTROLLED GESTATIONAL DIABETES MELLITUS (GDM), ANTEPARTUM: Primary | ICD-10-CM

## 2019-02-25 DIAGNOSIS — Z98.891 HX OF CESAREAN SECTION: ICD-10-CM

## 2019-02-25 DIAGNOSIS — O34.219 MATERNAL CARE FOR SCAR FROM PREVIOUS CESAREAN DELIVERY, UNSPECIFIED PRIOR CESAREAN DELIVERY TYPE: ICD-10-CM

## 2019-02-25 DIAGNOSIS — O09.293 PREGNANCY WITH POOR REPRODUCTIVE HISTORY IN THIRD TRIMESTER: ICD-10-CM

## 2019-02-25 DIAGNOSIS — J06.9 UPPER RESPIRATORY TRACT INFECTION, UNSPECIFIED TYPE: ICD-10-CM

## 2019-02-25 DIAGNOSIS — O24.410 DIET CONTROLLED GESTATIONAL DIABETES MELLITUS (GDM) IN THIRD TRIMESTER: ICD-10-CM

## 2019-02-25 PROCEDURE — 99213 PR OFFICE/OUTPT VISIT, EST, LEVL III, 20-29 MIN: ICD-10-PCS | Mod: TH,S$PBB,, | Performed by: OBSTETRICS & GYNECOLOGY

## 2019-02-25 PROCEDURE — 99212 OFFICE O/P EST SF 10 MIN: CPT | Mod: PBBFAC,TH,PO | Performed by: OBSTETRICS & GYNECOLOGY

## 2019-02-25 PROCEDURE — 99999 PR PBB SHADOW E&M-EST. PATIENT-LVL II: ICD-10-PCS | Mod: PBBFAC,,, | Performed by: OBSTETRICS & GYNECOLOGY

## 2019-02-25 PROCEDURE — 99999 PR PBB SHADOW E&M-EST. PATIENT-LVL II: CPT | Mod: PBBFAC,,, | Performed by: OBSTETRICS & GYNECOLOGY

## 2019-02-25 PROCEDURE — 99213 OFFICE O/P EST LOW 20 MIN: CPT | Mod: TH,S$PBB,, | Performed by: OBSTETRICS & GYNECOLOGY

## 2019-02-25 RX ORDER — AZITHROMYCIN 250 MG/1
TABLET, FILM COATED ORAL
Qty: 6 TABLET | Refills: 0 | Status: SHIPPED | OUTPATIENT
Start: 2019-02-25 | End: 2019-03-11

## 2019-02-25 NOTE — PROGRESS NOTES
Complaints today:  Patient reports continues to have soar throat, runny nose and congestion now for almost 3 weeks. Denies  Fever.   Denies vb, lof, or contractions. Good fetal movement.     States she has not been taking her blood glucose because her daughter was sick and hospitalized. When she was taking it (a week ago) reports all fastings in the 90s and 2 hr PP ran .       /82   Wt 71.2 kg (156 lb 13.7 oz)   LMP 2018   BMI 30.63 kg/m²     33 y.o., at 30w2d by Estimated Date of Delivery: 19  Patient Active Problem List   Diagnosis    Maternal care for scar from previous  delivery    Pregnancy with poor reproductive history, antepartum    Diet controlled gestational diabetes mellitus (GDM), antepartum    Encounter for sterilization     OB History    Para Term  AB Living   3 2       2   SAB TAB Ectopic Multiple Live Births           2      # Outcome Date GA Lbr Adam/2nd Weight Sex Delivery Anes PTL Lv   3 Current            2 Para            1 Para                   Dating reviewed    Allergies and problem list reviewed and updated    Medical and surgical history reviewed    Prenatal labs reviewed and updated    Physical Exam:  ABD: soft, gravid, nontender, +FHTs    Assessment:  IUP at 30w2d   Diet Controlled GDM   History of CSx2  Unwanted Fertility     Plan:     IUP at 30w2d   - Labs UTD  - S/p Flu/TDAP     Diet Controlled GDM   - Reports control but has not measured in last week. Reports compliance with diet. DISCUSSED IMPORTANCE OF LOGS. Pt understands. RTC one week with logs.   - Repeat MFM Growth Scan @ 32 wks. Scheduled.    H/o CSx2  - Plan for RLTCS    Unwanted Fertility  - Signed tubal consents on     URI   - Persistent URI symptoms x 2 weeks. Will send Rx for Zpack.      follow up 1 week to review glucose logs.

## 2019-03-04 ENCOUNTER — ROUTINE PRENATAL (OUTPATIENT)
Dept: OBSTETRICS AND GYNECOLOGY | Facility: CLINIC | Age: 34
End: 2019-03-04
Payer: MEDICAID

## 2019-03-04 VITALS
BODY MASS INDEX: 30.83 KG/M2 | DIASTOLIC BLOOD PRESSURE: 70 MMHG | SYSTOLIC BLOOD PRESSURE: 102 MMHG | WEIGHT: 157.88 LBS

## 2019-03-04 DIAGNOSIS — O24.410 DIET CONTROLLED GESTATIONAL DIABETES MELLITUS (GDM) IN THIRD TRIMESTER: ICD-10-CM

## 2019-03-04 DIAGNOSIS — J01.90 ACUTE RHINOSINUSITIS: ICD-10-CM

## 2019-03-04 DIAGNOSIS — O09.293 PREGNANCY WITH POOR REPRODUCTIVE HISTORY IN THIRD TRIMESTER: Primary | ICD-10-CM

## 2019-03-04 PROCEDURE — 99213 PR OFFICE/OUTPT VISIT, EST, LEVL III, 20-29 MIN: ICD-10-PCS | Mod: TH,S$PBB,, | Performed by: OBSTETRICS & GYNECOLOGY

## 2019-03-04 PROCEDURE — 99999 PR PBB SHADOW E&M-EST. PATIENT-LVL II: ICD-10-PCS | Mod: PBBFAC,,, | Performed by: OBSTETRICS & GYNECOLOGY

## 2019-03-04 PROCEDURE — 99212 OFFICE O/P EST SF 10 MIN: CPT | Mod: PBBFAC,TH,PO | Performed by: OBSTETRICS & GYNECOLOGY

## 2019-03-04 PROCEDURE — 99999 PR PBB SHADOW E&M-EST. PATIENT-LVL II: CPT | Mod: PBBFAC,,, | Performed by: OBSTETRICS & GYNECOLOGY

## 2019-03-04 PROCEDURE — 99213 OFFICE O/P EST LOW 20 MIN: CPT | Mod: TH,S$PBB,, | Performed by: OBSTETRICS & GYNECOLOGY

## 2019-03-04 RX ORDER — AMOXICILLIN 500 MG/1
500 TABLET, FILM COATED ORAL 3 TIMES DAILY
Qty: 21 TABLET | Refills: 0 | Status: SHIPPED | OUTPATIENT
Start: 2019-03-04 | End: 2019-03-11

## 2019-03-07 ENCOUNTER — PROCEDURE VISIT (OUTPATIENT)
Dept: MATERNAL FETAL MEDICINE | Facility: CLINIC | Age: 34
End: 2019-03-07
Payer: MEDICAID

## 2019-03-07 ENCOUNTER — INITIAL CONSULT (OUTPATIENT)
Dept: MATERNAL FETAL MEDICINE | Facility: CLINIC | Age: 34
End: 2019-03-07
Payer: MEDICAID

## 2019-03-07 VITALS
DIASTOLIC BLOOD PRESSURE: 70 MMHG | BODY MASS INDEX: 30.85 KG/M2 | SYSTOLIC BLOOD PRESSURE: 100 MMHG | WEIGHT: 157.94 LBS

## 2019-03-07 DIAGNOSIS — O09.523 ELDERLY MULTIGRAVIDA IN THIRD TRIMESTER: ICD-10-CM

## 2019-03-07 DIAGNOSIS — Z36.89 ENCOUNTER FOR ULTRASOUND TO CHECK FETAL GROWTH: ICD-10-CM

## 2019-03-07 DIAGNOSIS — O24.410 DIET CONTROLLED GESTATIONAL DIABETES MELLITUS (GDM), ANTEPARTUM: ICD-10-CM

## 2019-03-07 PROCEDURE — 76816 OB US FOLLOW-UP PER FETUS: CPT | Mod: PBBFAC,PO | Performed by: OBSTETRICS & GYNECOLOGY

## 2019-03-07 PROCEDURE — 76819 FETAL BIOPHYS PROFIL W/O NST: CPT | Mod: 26,S$PBB,, | Performed by: OBSTETRICS & GYNECOLOGY

## 2019-03-07 PROCEDURE — 76816 PR  US,PREGNANT UTERUS,F/U,TRANSABD APP: ICD-10-PCS | Mod: 26,S$PBB,, | Performed by: OBSTETRICS & GYNECOLOGY

## 2019-03-07 PROCEDURE — 76819 PR US, OB, FETAL BIOPHYSICAL, W/O NST: ICD-10-PCS | Mod: 26,S$PBB,, | Performed by: OBSTETRICS & GYNECOLOGY

## 2019-03-07 PROCEDURE — 76816 OB US FOLLOW-UP PER FETUS: CPT | Mod: 26,S$PBB,, | Performed by: OBSTETRICS & GYNECOLOGY

## 2019-03-07 PROCEDURE — 76819 FETAL BIOPHYS PROFIL W/O NST: CPT | Mod: PBBFAC,PO | Performed by: OBSTETRICS & GYNECOLOGY

## 2019-03-07 PROCEDURE — 99212 PR OFFICE/OUTPT VISIT, EST, LEVL II, 10-19 MIN: ICD-10-PCS | Mod: S$PBB,TH,25, | Performed by: OBSTETRICS & GYNECOLOGY

## 2019-03-07 PROCEDURE — 99212 OFFICE O/P EST SF 10 MIN: CPT | Mod: S$PBB,TH,25, | Performed by: OBSTETRICS & GYNECOLOGY

## 2019-03-07 NOTE — PROGRESS NOTES
Indication  ========    F/U Consultation. Follow-up evaluation for fetal growth/ BS check .    History  ======    General History  Other: MATTHEW LOYA  Risk Factors  Details: AMA  Details: GDM    Pregnancy History  ==============    Maternal Lab Tests  Genetic screening declined.      Maternal Assessment  =================    Weight 72 kg  Weight (lb) 159 lb  BP syst 100 mmHg  BP diast 70 mmHg    Method  ======    Transabdominal ultrasound examination, 2D Color Doppler, Voluson S8. View: Good view.    Pregnancy  =========    Marcial pregnancy. Number of fetuses: 1.    Dating  ======    Cycle: regular cycle  Ultrasound examination on: 3/7/2019  GA by U/S based upon: AC, BPD, Femur, HC  GA by U/S 31 w + 4 d  GEORGINA by U/S: 5/5/2019  Assigned: Dating performed on 11/26/2018, based on the LMP  Assigned GA 32 w + 0 d  Assigned GEORGINA: 5/2/2019    General Evaluation  ==============    Cardiac activity: present.  bpm.  Fetal movements: visualized.  Presentation: cephalic.  Placenta: anterior.  Amniotic fluid: Amount of AF: normal amount. MVP 7.7 cm. TIMOTEO 12.6 cm. Q1 5.2 cm, Q2 0.0 cm, Q3 0.0 cm, Q4 7.4 cm.    Biophysical Profile  ==============    2: Fetal breathing movements  2: Gross body movements  2: Fetal tone  2: Amniotic fluid volume  8/8: Biophysical profile score    Fetal Biometry  ============    Fetal Biometry  BPD 76.3 mm 30w 4d Hadlock  .4 mm 34w 1d Ollie  .8 mm 31w 6d Hadlock  .1 mm 32w 5d Hadlock  Femur 59.0 mm 30w 5d Hadlock  EFW 1,857 g 27% Juancarlos  Calculated by: Hadlock (BPD-HC-AC-FL)  EFW (lb) 4 lb  EFW (oz) 1 oz  Cephalic index 0.73  HC / AC 1.01  FL / BPD 0.77  FL / HC 0.20  FL / AC 0.21  MVP 7.7 cm  TIMOTEO 12.6 cm   bpm    Fetal Anatomy  ===========    Cranium: normal  Stomach: normal  Kidneys: normal  Bladder: normal  Other: A full anatomy survey previously performed.    Consultation  ==========    /70  F/u GDM-diet controlled.  Did not bring log. FBS 93, postprandial  122. States only 2 elevated postprandial blood sugars in last week.  Discussed important to check blood sugar 4x/day and record them on log. Advised to bring to appointment with primary OB on Monday.  Patient plans for CD with BTL at 39 weeks. She is certain of BTL desires and would like copy of papers-advised to discuss with her primary  OB.  Repeat fetal growth in 4-5 weeks.  Certified  present.  Time  I overall spent approximately 10 minutes in face to face time with the patient and her family, greater than 50% of which was in counseling and  care coordination.    Impression  =========    Fetal size is AGA with the EFW at the 27th percentile.  Normal repeat limited fetal anatomic survey. AFV is normal.  BPP 8/8.    Recommendation  ==============    Repeat fetal growth ultrasound in 4-5 weeks.  Please re-consult MFM as clinically indicated.

## 2019-03-07 NOTE — LETTER
March 7, 2019      Cori Gustafson MD  [address]  [facility] [address]           [facility]  [address] [address]  Phone: [phone]  Fax: [phone]          Patient: Richard Gonzalez   MR Number: [id number]   YOB: 1985   Date of Visit: 3/7/2019       Dear Dr. Cori Gustafson:    Thank you for referring Richard Gonzalez to me for evaluation. Attached you will find relevant portions of my assessment and plan of care.    If you have questions, please do not hesitate to call me. I look forward to following Richard Gonzalez along with you.    Sincerely,    Clara Smiley MD    Enclosure  CC:  No Recipients    If you would like to receive this communication electronically, please contact [email or web address] or [phone] to request more information on [facility] access.    For providers and/or their staff who would like to refer a patient to [facility], please contact us through our one-stop-shop provider referral line, [facility], at [phone].    If you feel you have received this communication in error or would no longer like to receive these types of communications, please e-mail [email or web address]

## 2019-03-08 ENCOUNTER — TELEPHONE (OUTPATIENT)
Dept: OBSTETRICS AND GYNECOLOGY | Facility: CLINIC | Age: 34
End: 2019-03-08

## 2019-03-08 NOTE — PROGRESS NOTES
Chief Complaint   Patient presents with    Routine Prenatal Visit       33 y.o., at 31w6d by Estimated Date of Delivery: 19    Complaints today: Cough, congestion, and earache for about 4 weeks.  Some improvement in symptoms following Z-pack last week.    ROS  OBSTETRICS:   Contractions N   Bleeding N   Loss of fluid N   Fetal mvmnt Y  GASTRO:   Nausea N   Vomiting N      OB History    Para Term  AB Living   3 2       2   SAB TAB Ectopic Multiple Live Births           2      # Outcome Date GA Lbr Adam/2nd Weight Sex Delivery Anes PTL Lv   3 Current            2 Para            1 Para                   Dating reviewed  Allergies and problem list reviewed and updated  Medical and surgical history reviewed  Prenatal labs reviewed and updated    PHYSICAL EXAM  /70   Wt 71.6 kg (157 lb 13.6 oz)   LMP 2018   BMI 30.83 kg/m²     GENERAL: No acute distress  NEURO: Alert and oriented x3  PSYCH: Normal mood and affect  PULMONARY: Non-labored respiration  ABDomen: Soft, gravid, nontender    ASSESSMENT AND PLAN    Pregnancy Problems (from 18 to present)     Problem Noted Resolved    Encounter for sterilization 2019 by HAYDE Sr MD No    Overview Signed 2019  3:15 PM by HAYDE Sr MD     2019: Medicaid sterilization consents signed.         Diet controlled gestational diabetes mellitus (GDM), antepartum 2019 by Laverne Huang CNM No    Maternal care for scar from previous  delivery 2018 by Laverne Huang CNM No    Overview Addendum 1/15/2019  9:37 AM by Laverne Huang CNM     Previous  x 2  Desires BTL         Pregnancy with poor reproductive history, antepartum 2018 by Laverne Huang CNM No    Overview Addendum 3/8/2019  9:17 AM by HAYDE Sr MD     History of gHTN in prior pregnancy.    Dating - By LMP consistent with 17 week [facility] u/s.  U/S - Normal anatomy.  2019: vtx, 50%ile.  3/7/2019: repeat  scheduled.  Aneuploidy screening - Declined.  Vaccines - 1/15/2019: flu vaccine and Tdap.  Contraception -  2019: Medicaid sterilization consents signed.  Pap - 2018: NILM/HPV(-).               PNL - Up to date.  U/S - Repeat scheduled for 3/7/2019.  gDM - Patient has not been checking her blood sugar regularly (1 day in the past few weeks).  Normal blood sugars for the 1 day recorded.  Encouraged patient to check and record her blood sugar 4 times per day.  Encouraged patient to bring her blood sugar log to her [facility] appt.  Sinusitis - Will re-treat with amoxicillin.     labor precautions given  Follow-up: 2 weeks

## 2019-03-08 NOTE — TELEPHONE ENCOUNTER
Called to notify pt that her ultrasound was normal and the baaby weighs 4 pounds and 1 ounce, which is normal at this point in pregnancy.  No answer. Left VM message.

## 2019-03-08 NOTE — TELEPHONE ENCOUNTER
----- Message from Eleanor Van LPN sent at 3/7/2019  4:46 PM CST -----  HAYDE Sr MD  [facility] Obstetrics [facility] Gynecology Clinical Support Staff         Please notify patient that her ultrasound was normal.  The baby weighs 4 pounds and 1 ounce, which is normal at this point in pregnancy.  Thanks.

## 2019-03-11 ENCOUNTER — ROUTINE PRENATAL (OUTPATIENT)
Dept: OBSTETRICS AND GYNECOLOGY | Facility: CLINIC | Age: 34
End: 2019-03-11
Payer: MEDICAID

## 2019-03-11 VITALS — SYSTOLIC BLOOD PRESSURE: 100 MMHG | BODY MASS INDEX: 31 KG/M2 | WEIGHT: 158.75 LBS | DIASTOLIC BLOOD PRESSURE: 72 MMHG

## 2019-03-11 DIAGNOSIS — Z98.891 HX OF CESAREAN SECTION: ICD-10-CM

## 2019-03-11 DIAGNOSIS — O24.410 DIET CONTROLLED GESTATIONAL DIABETES MELLITUS (GDM), ANTEPARTUM: ICD-10-CM

## 2019-03-11 DIAGNOSIS — R05.9 COUGH: Primary | ICD-10-CM

## 2019-03-11 DIAGNOSIS — O24.410 DIET CONTROLLED GESTATIONAL DIABETES MELLITUS (GDM) IN THIRD TRIMESTER: ICD-10-CM

## 2019-03-11 DIAGNOSIS — O34.219 MATERNAL CARE FOR SCAR FROM PREVIOUS CESAREAN DELIVERY, UNSPECIFIED PRIOR CESAREAN DELIVERY TYPE: ICD-10-CM

## 2019-03-11 PROCEDURE — 99212 OFFICE O/P EST SF 10 MIN: CPT | Mod: PBBFAC,TH,PO | Performed by: OBSTETRICS & GYNECOLOGY

## 2019-03-11 PROCEDURE — 99213 PR OFFICE/OUTPT VISIT, EST, LEVL III, 20-29 MIN: ICD-10-PCS | Mod: TH,S$PBB,, | Performed by: OBSTETRICS & GYNECOLOGY

## 2019-03-11 PROCEDURE — 99213 OFFICE O/P EST LOW 20 MIN: CPT | Mod: TH,S$PBB,, | Performed by: OBSTETRICS & GYNECOLOGY

## 2019-03-11 PROCEDURE — 99999 PR PBB SHADOW E&M-EST. PATIENT-LVL II: CPT | Mod: PBBFAC,,, | Performed by: OBSTETRICS & GYNECOLOGY

## 2019-03-11 PROCEDURE — 99999 PR PBB SHADOW E&M-EST. PATIENT-LVL II: ICD-10-PCS | Mod: PBBFAC,,, | Performed by: OBSTETRICS & GYNECOLOGY

## 2019-03-11 RX ORDER — BENZONATATE 100 MG/1
100 CAPSULE ORAL DAILY PRN
Qty: 30 CAPSULE | Refills: 0 | Status: ON HOLD | OUTPATIENT
Start: 2019-03-11 | End: 2019-04-30 | Stop reason: HOSPADM

## 2019-03-11 NOTE — PROGRESS NOTES
Complaints today:     Pt reports cough still bothering her at night. Much better than before. Is taking amoxicillin as prescribed. Denies vb, lof, or contractions. Good fetal movement.     Brought blood glucose logs and all controlled fasting 92-94. 2hr PP .       /72   Wt 72 kg (158 lb 11.7 oz)   LMP 2018   BMI 31.00 kg/m²     33 y.o., at 32w2d by Estimated Date of Delivery: 19  Patient Active Problem List   Diagnosis    Maternal care for scar from previous  delivery    Pregnancy with poor reproductive history, antepartum    Diet controlled gestational diabetes mellitus (GDM), antepartum    Encounter for sterilization     OB History    Para Term  AB Living   3 2       2   SAB TAB Ectopic Multiple Live Births           2      # Outcome Date GA Lbr Adam/2nd Weight Sex Delivery Anes PTL Lv   3 Current            2 Para            1 Para                   Dating reviewed    Allergies and problem list reviewed and updated    Medical and surgical history reviewed    Prenatal labs reviewed and updated    Physical Exam:  ABD: soft, gravid, nontender, +FHTs    Assessment:  IUP at 32w2d   Cough  GMD (Diet)    Plan:     IUP at 32w2d   - Labs UTD  - Immunizations: complete     GDM (Diet)  - Controlled as reported in HPI  - Continue to log BGs   - Growth scan UTD and WNL   - PNT only indicated if medical mgmt becomes necessary     H/o CSx2 + Unwanted fertility  - Plan for RTLCS + BTL (signed medicaid tubal consent: 19)     Cough/URI  - Complete amoxicillin  - Tessalon Perles PRN at night for relief          follow up 2 Weeks

## 2019-04-01 ENCOUNTER — ROUTINE PRENATAL (OUTPATIENT)
Dept: OBSTETRICS AND GYNECOLOGY | Facility: CLINIC | Age: 34
End: 2019-04-01
Payer: MEDICAID

## 2019-04-01 VITALS — DIASTOLIC BLOOD PRESSURE: 70 MMHG | WEIGHT: 161.81 LBS | BODY MASS INDEX: 31.6 KG/M2 | SYSTOLIC BLOOD PRESSURE: 100 MMHG

## 2019-04-01 DIAGNOSIS — O09.299 PREGNANCY WITH POOR REPRODUCTIVE HISTORY, ANTEPARTUM: ICD-10-CM

## 2019-04-01 DIAGNOSIS — O24.410 DIET CONTROLLED GESTATIONAL DIABETES MELLITUS (GDM), ANTEPARTUM: Primary | ICD-10-CM

## 2019-04-01 DIAGNOSIS — O34.219 MATERNAL CARE FOR SCAR FROM PREVIOUS CESAREAN DELIVERY, UNSPECIFIED PRIOR CESAREAN DELIVERY TYPE: ICD-10-CM

## 2019-04-01 DIAGNOSIS — Z30.2 ENCOUNTER FOR STERILIZATION: ICD-10-CM

## 2019-04-01 PROCEDURE — 99213 PR OFFICE/OUTPT VISIT, EST, LEVL III, 20-29 MIN: ICD-10-PCS | Mod: TH,S$PBB,, | Performed by: OBSTETRICS & GYNECOLOGY

## 2019-04-01 PROCEDURE — 99999 PR PBB SHADOW E&M-EST. PATIENT-LVL II: ICD-10-PCS | Mod: PBBFAC,,, | Performed by: OBSTETRICS & GYNECOLOGY

## 2019-04-01 PROCEDURE — 99212 OFFICE O/P EST SF 10 MIN: CPT | Mod: PBBFAC,TH,PO | Performed by: OBSTETRICS & GYNECOLOGY

## 2019-04-01 PROCEDURE — 99999 PR PBB SHADOW E&M-EST. PATIENT-LVL II: CPT | Mod: PBBFAC,,, | Performed by: OBSTETRICS & GYNECOLOGY

## 2019-04-01 PROCEDURE — 99213 OFFICE O/P EST LOW 20 MIN: CPT | Mod: TH,S$PBB,, | Performed by: OBSTETRICS & GYNECOLOGY

## 2019-04-01 NOTE — PROGRESS NOTES
Complaints today:    Pt has no complaints. Denies vb, lof, or contractions. Good fetal movement.     /70   LMP 2018     33 y.o., at 35w2d by Estimated Date of Delivery: 19  Patient Active Problem List   Diagnosis    Maternal care for scar from previous  delivery    Pregnancy with poor reproductive history, antepartum    Diet controlled gestational diabetes mellitus (GDM), antepartum    Encounter for sterilization     OB History    Para Term  AB Living   3 2       2   SAB TAB Ectopic Multiple Live Births           2      # Outcome Date GA Lbr Adam/2nd Weight Sex Delivery Anes PTL Lv   3 Current            2 Para            1 Para                Dating reviewed    Allergies and problem list reviewed and updated    Medical and surgical history reviewed    Prenatal labs reviewed and updated    Physical Exam:  ABD: soft, gravid, nontender, +FHTs    Assessment:  IUP at 35w2d   Diet Controlled GDM     Plan:     IUP at 35w2d   - GBS, HIV, RPR, CBC on RTC.  - S/p flu/TDAP  - S/p Normal anatomy    Diet Controlled GDM   - Blood glucose logs reviewed , all fasting < 95 all 2hr PP < 120.   - MFM appt tomorrow. Repeat growth ordered.     H/o CS x 2 + Unwanted Fertility   - Schedule repeat CS + BTL  for 39 weeks at next visit        follow up 1 Weeks

## 2019-04-02 ENCOUNTER — PROCEDURE VISIT (OUTPATIENT)
Dept: MATERNAL FETAL MEDICINE | Facility: CLINIC | Age: 34
End: 2019-04-02
Payer: MEDICAID

## 2019-04-02 DIAGNOSIS — O24.410 DIET CONTROLLED GESTATIONAL DIABETES MELLITUS (GDM), ANTEPARTUM: ICD-10-CM

## 2019-04-02 DIAGNOSIS — O09.523 AMA (ADVANCED MATERNAL AGE) MULTIGRAVIDA 35+, THIRD TRIMESTER: ICD-10-CM

## 2019-04-02 PROCEDURE — 99499 NO LOS: ICD-10-PCS | Mod: S$PBB,,, | Performed by: OBSTETRICS & GYNECOLOGY

## 2019-04-02 PROCEDURE — 76819 FETAL BIOPHYS PROFIL W/O NST: CPT | Mod: 26,S$PBB,, | Performed by: OBSTETRICS & GYNECOLOGY

## 2019-04-02 PROCEDURE — 99499 UNLISTED E&M SERVICE: CPT | Mod: S$PBB,,, | Performed by: OBSTETRICS & GYNECOLOGY

## 2019-04-02 PROCEDURE — 76816 PR  US,PREGNANT UTERUS,F/U,TRANSABD APP: ICD-10-PCS | Mod: 26,S$PBB,, | Performed by: OBSTETRICS & GYNECOLOGY

## 2019-04-02 PROCEDURE — 76819 PR US, OB, FETAL BIOPHYSICAL, W/O NST: ICD-10-PCS | Mod: 26,S$PBB,, | Performed by: OBSTETRICS & GYNECOLOGY

## 2019-04-02 PROCEDURE — 76816 OB US FOLLOW-UP PER FETUS: CPT | Mod: 26,S$PBB,, | Performed by: OBSTETRICS & GYNECOLOGY

## 2019-04-02 PROCEDURE — 76816 OB US FOLLOW-UP PER FETUS: CPT | Mod: PBBFAC,PO | Performed by: OBSTETRICS & GYNECOLOGY

## 2019-04-02 PROCEDURE — 76819 FETAL BIOPHYS PROFIL W/O NST: CPT | Mod: PBBFAC,PO | Performed by: OBSTETRICS & GYNECOLOGY

## 2019-04-03 NOTE — PROGRESS NOTES
Obstetrical ultrasound completed today.  See report in imaging section of EPIC.  Normal fluid and growth  BPP 8/8

## 2019-04-08 ENCOUNTER — ROUTINE PRENATAL (OUTPATIENT)
Dept: OBSTETRICS AND GYNECOLOGY | Facility: CLINIC | Age: 34
End: 2019-04-08
Payer: MEDICAID

## 2019-04-08 ENCOUNTER — APPOINTMENT (OUTPATIENT)
Dept: LAB | Facility: HOSPITAL | Age: 34
End: 2019-04-08
Attending: OBSTETRICS & GYNECOLOGY
Payer: MEDICAID

## 2019-04-08 VITALS
BODY MASS INDEX: 31.62 KG/M2 | WEIGHT: 161.94 LBS | DIASTOLIC BLOOD PRESSURE: 78 MMHG | SYSTOLIC BLOOD PRESSURE: 100 MMHG

## 2019-04-08 DIAGNOSIS — Z30.2 ENCOUNTER FOR STERILIZATION: ICD-10-CM

## 2019-04-08 DIAGNOSIS — O24.410 DIET CONTROLLED GESTATIONAL DIABETES MELLITUS (GDM), ANTEPARTUM: ICD-10-CM

## 2019-04-08 DIAGNOSIS — O09.299 PREGNANCY WITH POOR REPRODUCTIVE HISTORY, ANTEPARTUM: Primary | ICD-10-CM

## 2019-04-08 DIAGNOSIS — O34.219 MATERNAL CARE FOR SCAR FROM PREVIOUS CESAREAN DELIVERY, UNSPECIFIED PRIOR CESAREAN DELIVERY TYPE: ICD-10-CM

## 2019-04-08 LAB
BASOPHILS # BLD AUTO: 0.02 K/UL (ref 0–0.2)
BASOPHILS NFR BLD: 0.2 % (ref 0–1.9)
DIFFERENTIAL METHOD: ABNORMAL
EOSINOPHIL # BLD AUTO: 0 K/UL (ref 0–0.5)
EOSINOPHIL NFR BLD: 0.5 % (ref 0–8)
ERYTHROCYTE [DISTWIDTH] IN BLOOD BY AUTOMATED COUNT: 15.2 % (ref 11.5–14.5)
HCT VFR BLD AUTO: 36.2 % (ref 37–48.5)
HGB BLD-MCNC: 11.6 G/DL (ref 12–16)
IMM GRANULOCYTES # BLD AUTO: 0.05 K/UL (ref 0–0.04)
IMM GRANULOCYTES NFR BLD AUTO: 0.6 % (ref 0–0.5)
LYMPHOCYTES # BLD AUTO: 2.4 K/UL (ref 1–4.8)
LYMPHOCYTES NFR BLD: 29.6 % (ref 18–48)
MCH RBC QN AUTO: 28.2 PG (ref 27–31)
MCHC RBC AUTO-ENTMCNC: 32 G/DL (ref 32–36)
MCV RBC AUTO: 88 FL (ref 82–98)
MONOCYTES # BLD AUTO: 0.7 K/UL (ref 0.3–1)
MONOCYTES NFR BLD: 8.1 % (ref 4–15)
NEUTROPHILS # BLD AUTO: 4.9 K/UL (ref 1.8–7.7)
NEUTROPHILS NFR BLD: 61 % (ref 38–73)
NRBC BLD-RTO: 0 /100 WBC
PLATELET # BLD AUTO: 201 K/UL (ref 150–350)
PMV BLD AUTO: 12.3 FL (ref 9.2–12.9)
RBC # BLD AUTO: 4.11 M/UL (ref 4–5.4)
WBC # BLD AUTO: 8.07 K/UL (ref 3.9–12.7)

## 2019-04-08 PROCEDURE — 86592 SYPHILIS TEST NON-TREP QUAL: CPT

## 2019-04-08 PROCEDURE — 99999 PR PBB SHADOW E&M-EST. PATIENT-LVL II: ICD-10-PCS | Mod: PBBFAC,,, | Performed by: OBSTETRICS & GYNECOLOGY

## 2019-04-08 PROCEDURE — 85025 COMPLETE CBC W/AUTO DIFF WBC: CPT

## 2019-04-08 PROCEDURE — 86703 HIV-1/HIV-2 1 RESULT ANTBDY: CPT

## 2019-04-08 PROCEDURE — 87081 CULTURE SCREEN ONLY: CPT

## 2019-04-08 PROCEDURE — 99212 OFFICE O/P EST SF 10 MIN: CPT | Mod: PBBFAC,TH,PO | Performed by: OBSTETRICS & GYNECOLOGY

## 2019-04-08 PROCEDURE — 99213 OFFICE O/P EST LOW 20 MIN: CPT | Mod: TH,S$PBB,, | Performed by: OBSTETRICS & GYNECOLOGY

## 2019-04-08 PROCEDURE — 99999 PR PBB SHADOW E&M-EST. PATIENT-LVL II: CPT | Mod: PBBFAC,,, | Performed by: OBSTETRICS & GYNECOLOGY

## 2019-04-08 PROCEDURE — 99213 PR OFFICE/OUTPT VISIT, EST, LEVL III, 20-29 MIN: ICD-10-PCS | Mod: TH,S$PBB,, | Performed by: OBSTETRICS & GYNECOLOGY

## 2019-04-08 NOTE — PROGRESS NOTES
Complaints today: Doing well.     Pt has no complaints. Denies vb, lof, or contractions. Good fetal movement.      Does not have BG logs but reports highest 2hr PP is 122 and highest fasting is 100 , with most being 95 or less.     /78   Wt 73.5 kg (161 lb 14.9 oz)   LMP 2018   BMI 31.62 kg/m²     33 y.o., at 36w2d by Estimated Date of Delivery: 19  Patient Active Problem List   Diagnosis    Maternal care for scar from previous  delivery    Pregnancy with poor reproductive history, antepartum    Diet controlled gestational diabetes mellitus (GDM), antepartum    Encounter for sterilization     OB History    Para Term  AB Living   3 2       2   SAB TAB Ectopic Multiple Live Births           2      # Outcome Date GA Lbr Adam/2nd Weight Sex Delivery Anes PTL Lv   3 Current            2 Para            1 Para                Dating reviewed    Allergies and problem list reviewed and updated    Medical and surgical history reviewed    Prenatal labs reviewed and updated    Physical Exam:  ABD: soft, gravid, nontender, +FHTs     Assessment:  IUP at 36w2d   H/o CS x 2  Undesired Fertility  Diet controlled GDM     Plan:     IUP at 36w2d   - 3T labs and GBS today   - S/p Flu and TDAP   - Normal anatomy, normal growth     Diet Controlled GDM  - Bring logs next week  - MFM US WNL, EFW 40%    H/o CSx2  - Scheduled RLTCS& BTL  May 1 @ NOON     AMA  - Declined Genetic Screening      follow up 1 Weeks

## 2019-04-09 LAB
HIV 1+2 AB+HIV1 P24 AG SERPL QL IA: NEGATIVE
RPR SER QL: NORMAL

## 2019-04-10 LAB — BACTERIA SPEC AEROBE CULT: NORMAL

## 2019-04-15 ENCOUNTER — ROUTINE PRENATAL (OUTPATIENT)
Dept: OBSTETRICS AND GYNECOLOGY | Facility: CLINIC | Age: 34
End: 2019-04-15
Payer: MEDICAID

## 2019-04-15 VITALS
SYSTOLIC BLOOD PRESSURE: 100 MMHG | DIASTOLIC BLOOD PRESSURE: 76 MMHG | BODY MASS INDEX: 32.29 KG/M2 | WEIGHT: 165.38 LBS

## 2019-04-15 DIAGNOSIS — O24.410 DIET CONTROLLED GESTATIONAL DIABETES MELLITUS (GDM), ANTEPARTUM: ICD-10-CM

## 2019-04-15 DIAGNOSIS — Z30.2 ENCOUNTER FOR STERILIZATION: ICD-10-CM

## 2019-04-15 DIAGNOSIS — O34.219 MATERNAL CARE FOR SCAR FROM PREVIOUS CESAREAN DELIVERY, UNSPECIFIED PRIOR CESAREAN DELIVERY TYPE: Primary | ICD-10-CM

## 2019-04-15 PROCEDURE — 99213 OFFICE O/P EST LOW 20 MIN: CPT | Mod: TH,S$PBB,, | Performed by: OBSTETRICS & GYNECOLOGY

## 2019-04-15 PROCEDURE — 99999 PR PBB SHADOW E&M-EST. PATIENT-LVL II: CPT | Mod: PBBFAC,,, | Performed by: OBSTETRICS & GYNECOLOGY

## 2019-04-15 PROCEDURE — 99212 OFFICE O/P EST SF 10 MIN: CPT | Mod: PBBFAC,TH,PO | Performed by: OBSTETRICS & GYNECOLOGY

## 2019-04-15 PROCEDURE — 99999 PR PBB SHADOW E&M-EST. PATIENT-LVL II: ICD-10-PCS | Mod: PBBFAC,,, | Performed by: OBSTETRICS & GYNECOLOGY

## 2019-04-15 PROCEDURE — 99213 PR OFFICE/OUTPT VISIT, EST, LEVL III, 20-29 MIN: ICD-10-PCS | Mod: TH,S$PBB,, | Performed by: OBSTETRICS & GYNECOLOGY

## 2019-04-15 NOTE — PROGRESS NOTES
Complaints today: Forgot glucolse logs again.  Reports 2hr PP < 120  Fasting 95 or less  Pt has no complaints. Denies vb, lof, or contractions. Good fetal movement.       /76   Wt 75 kg (165 lb 5.5 oz)   LMP 2018   BMI 32.29 kg/m²     33 y.o., at 37w2d by Estimated Date of Delivery: 19  Patient Active Problem List   Diagnosis    Maternal care for scar from previous  delivery    Pregnancy with poor reproductive history, antepartum    Diet controlled gestational diabetes mellitus (GDM), antepartum    Encounter for sterilization     OB History    Para Term  AB Living   3 2       2   SAB TAB Ectopic Multiple Live Births           2      # Outcome Date GA Lbr Adam/2nd Weight Sex Delivery Anes PTL Lv   3 Current            2 Para            1 Para                Dating reviewed    Allergies and problem list reviewed and updated    Medical and surgical history reviewed    Prenatal labs reviewed and updated    Physical Exam:  ABD: soft, gravid, nontender, +FHTs    Assessment:  IUP at 37w2d   Diet Controlled GDM  H/o CS x 2   Unwanted Fertility     Plan:     RTC one week  Bring glucose logs. Per reports are controlled.     Abby Brush MD    Ob-Gyn

## 2019-04-22 ENCOUNTER — ROUTINE PRENATAL (OUTPATIENT)
Dept: OBSTETRICS AND GYNECOLOGY | Facility: CLINIC | Age: 34
End: 2019-04-22
Payer: MEDICAID

## 2019-04-22 VITALS
WEIGHT: 168.88 LBS | SYSTOLIC BLOOD PRESSURE: 100 MMHG | DIASTOLIC BLOOD PRESSURE: 79 MMHG | BODY MASS INDEX: 32.98 KG/M2

## 2019-04-22 DIAGNOSIS — O34.219 MATERNAL CARE FOR SCAR FROM PREVIOUS CESAREAN DELIVERY, UNSPECIFIED PRIOR CESAREAN DELIVERY TYPE: ICD-10-CM

## 2019-04-22 DIAGNOSIS — Z30.2 ENCOUNTER FOR STERILIZATION: ICD-10-CM

## 2019-04-22 DIAGNOSIS — O24.410 DIET CONTROLLED GESTATIONAL DIABETES MELLITUS (GDM), ANTEPARTUM: Primary | ICD-10-CM

## 2019-04-22 DIAGNOSIS — O09.299 PREGNANCY WITH POOR REPRODUCTIVE HISTORY, ANTEPARTUM: ICD-10-CM

## 2019-04-22 PROCEDURE — 99999 PR PBB SHADOW E&M-EST. PATIENT-LVL II: ICD-10-PCS | Mod: PBBFAC,,, | Performed by: OBSTETRICS & GYNECOLOGY

## 2019-04-22 PROCEDURE — 99213 PR OFFICE/OUTPT VISIT, EST, LEVL III, 20-29 MIN: ICD-10-PCS | Mod: TH,S$PBB,, | Performed by: OBSTETRICS & GYNECOLOGY

## 2019-04-22 PROCEDURE — 99212 OFFICE O/P EST SF 10 MIN: CPT | Mod: PBBFAC,TH,PO | Performed by: OBSTETRICS & GYNECOLOGY

## 2019-04-22 PROCEDURE — 99999 PR PBB SHADOW E&M-EST. PATIENT-LVL II: CPT | Mod: PBBFAC,,, | Performed by: OBSTETRICS & GYNECOLOGY

## 2019-04-22 PROCEDURE — 99213 OFFICE O/P EST LOW 20 MIN: CPT | Mod: TH,S$PBB,, | Performed by: OBSTETRICS & GYNECOLOGY

## 2019-04-22 NOTE — PROGRESS NOTES
Complaints today:  AGAIN , did not bring logs. Reports fasting 95 or less. Reports 2 hr  or less. DISCUSSED IMPORTANCE.   Pt has no complaints. Denies vb, lof, or contractions. Good fetal movement.     /79   Wt 76.6 kg (168 lb 14 oz)   LMP 2018   BMI 32.98 kg/m²     33 y.o., at 38w2d by Estimated Date of Delivery: 19  Patient Active Problem List   Diagnosis    Maternal care for scar from previous  delivery    Pregnancy with poor reproductive history, antepartum    Diet controlled gestational diabetes mellitus (GDM), antepartum    Encounter for sterilization     OB History    Para Term  AB Living   3 2       2   SAB TAB Ectopic Multiple Live Births           2      # Outcome Date GA Lbr Adam/2nd Weight Sex Delivery Anes PTL Lv   3 Current            2 Para            1 Para                Dating reviewed    Allergies and problem list reviewed and updated    Medical and surgical history reviewed    Prenatal labs reviewed and updated    Physical Exam:  ABD: soft, gravid, nontender, +FHTs    LAB: Random Glucose Check in clinic: 127      Assessment:  IUP at 38w2d   Diet controlled GDM  H/o CS   Unwanted fertility       Plan:     IUP at 38w2d   -Labs UTD   -Vaccines UTD      Diet controlled GDM  - Many weeks without logs although reports blood glucose within range  - Normal fetal growth @ 36 weeks (EFW 40%)   - Random Blood glucose today was 127 (less than 2 hr Post Prandial)   - Send to prenatal testing this week as pt has not brought logs in many weeks to visit to ensure reassuring modified BPP        follow up 1 Weeks

## 2019-04-23 ENCOUNTER — HOSPITAL ENCOUNTER (OUTPATIENT)
Dept: PERINATAL CARE | Facility: OTHER | Age: 34
Discharge: HOME | End: 2019-04-23
Attending: OBSTETRICS & GYNECOLOGY
Payer: MEDICAID

## 2019-04-23 DIAGNOSIS — O24.410 DIET CONTROLLED GESTATIONAL DIABETES MELLITUS (GDM), ANTEPARTUM: ICD-10-CM

## 2019-04-23 PROCEDURE — 76818 FETAL BIOPHYS PROFILE W/NST: CPT

## 2019-04-23 PROCEDURE — 76818 FETAL BIOPHYS PROFILE W/NST: CPT | Mod: 26,,, | Performed by: OBSTETRICS & GYNECOLOGY

## 2019-04-23 PROCEDURE — 76818 PR US, OB, FETAL BIOPHYSICAL, W/NST: ICD-10-PCS | Mod: 26,,, | Performed by: OBSTETRICS & GYNECOLOGY

## 2019-04-29 ENCOUNTER — HOSPITAL ENCOUNTER (INPATIENT)
Facility: OTHER | Age: 34
LOS: 2 days | Discharge: HOME | End: 2019-05-01
Attending: OBSTETRICS & GYNECOLOGY | Admitting: OBSTETRICS & GYNECOLOGY
Payer: MEDICAID

## 2019-04-29 ENCOUNTER — ANESTHESIA EVENT (OUTPATIENT)
Dept: OBSTETRICS AND GYNECOLOGY | Facility: OTHER | Age: 34
End: 2019-04-29
Payer: MEDICAID

## 2019-04-29 ENCOUNTER — ROUTINE PRENATAL (OUTPATIENT)
Dept: OBSTETRICS AND GYNECOLOGY | Facility: CLINIC | Age: 34
End: 2019-04-29
Payer: MEDICAID

## 2019-04-29 ENCOUNTER — ANESTHESIA (OUTPATIENT)
Dept: OBSTETRICS AND GYNECOLOGY | Facility: OTHER | Age: 34
End: 2019-04-29
Payer: MEDICAID

## 2019-04-29 VITALS
BODY MASS INDEX: 32.98 KG/M2 | WEIGHT: 168.88 LBS | SYSTOLIC BLOOD PRESSURE: 116 MMHG | DIASTOLIC BLOOD PRESSURE: 80 MMHG

## 2019-04-29 DIAGNOSIS — Z3A.39 39 WEEKS GESTATION OF PREGNANCY: ICD-10-CM

## 2019-04-29 DIAGNOSIS — Z30.2 ENCOUNTER FOR STERILIZATION: ICD-10-CM

## 2019-04-29 DIAGNOSIS — O09.299 PREGNANCY WITH POOR REPRODUCTIVE HISTORY, ANTEPARTUM: Primary | ICD-10-CM

## 2019-04-29 DIAGNOSIS — O24.410 DIET CONTROLLED GESTATIONAL DIABETES MELLITUS (GDM), ANTEPARTUM: ICD-10-CM

## 2019-04-29 DIAGNOSIS — O34.219 MATERNAL CARE FOR SCAR FROM PREVIOUS CESAREAN DELIVERY, UNSPECIFIED PRIOR CESAREAN DELIVERY TYPE: ICD-10-CM

## 2019-04-29 LAB
ABO + RH BLD: NORMAL
BASOPHILS # BLD AUTO: 0.01 K/UL (ref 0–0.2)
BASOPHILS NFR BLD: 0.1 % (ref 0–1.9)
BLD GP AB SCN CELLS X3 SERPL QL: NORMAL
DIFFERENTIAL METHOD: ABNORMAL
EOSINOPHIL # BLD AUTO: 0.1 K/UL (ref 0–0.5)
EOSINOPHIL NFR BLD: 0.8 % (ref 0–8)
ERYTHROCYTE [DISTWIDTH] IN BLOOD BY AUTOMATED COUNT: 15.3 % (ref 11.5–14.5)
HCT VFR BLD AUTO: 34.8 % (ref 37–48.5)
HGB BLD-MCNC: 11.2 G/DL (ref 12–16)
LYMPHOCYTES # BLD AUTO: 2.2 K/UL (ref 1–4.8)
LYMPHOCYTES NFR BLD: 30.6 % (ref 18–48)
MCH RBC QN AUTO: 27.3 PG (ref 27–31)
MCHC RBC AUTO-ENTMCNC: 32.2 G/DL (ref 32–36)
MCV RBC AUTO: 85 FL (ref 82–98)
MONOCYTES # BLD AUTO: 0.6 K/UL (ref 0.3–1)
MONOCYTES NFR BLD: 8.2 % (ref 4–15)
NEUTROPHILS # BLD AUTO: 4.4 K/UL (ref 1.8–7.7)
NEUTROPHILS NFR BLD: 60 % (ref 38–73)
PLATELET # BLD AUTO: 209 K/UL (ref 150–350)
PMV BLD AUTO: 13.1 FL (ref 9.2–12.9)
POCT GLUCOSE: 112 MG/DL (ref 70–110)
POCT GLUCOSE: 74 MG/DL (ref 70–110)
RBC # BLD AUTO: 4.11 M/UL (ref 4–5.4)
WBC # BLD AUTO: 7.32 K/UL (ref 3.9–12.7)

## 2019-04-29 PROCEDURE — 25000003 PHARM REV CODE 250: Performed by: ANESTHESIOLOGY

## 2019-04-29 PROCEDURE — 86850 RBC ANTIBODY SCREEN: CPT

## 2019-04-29 PROCEDURE — 59514 CESAREAN DELIVERY ONLY: CPT | Mod: GB,,, | Performed by: OBSTETRICS & GYNECOLOGY

## 2019-04-29 PROCEDURE — 63600175 PHARM REV CODE 636 W HCPCS: Performed by: OBSTETRICS & GYNECOLOGY

## 2019-04-29 PROCEDURE — 59514 PR CESAREAN DELIVERY ONLY: ICD-10-PCS | Mod: GB,,, | Performed by: OBSTETRICS & GYNECOLOGY

## 2019-04-29 PROCEDURE — 71000033 HC RECOVERY, INTIAL HOUR: Performed by: OBSTETRICS & GYNECOLOGY

## 2019-04-29 PROCEDURE — 88302 TISSUE EXAM BY PATHOLOGIST: CPT | Mod: 26,,, | Performed by: PATHOLOGY

## 2019-04-29 PROCEDURE — 63600175 PHARM REV CODE 636 W HCPCS: Performed by: ANESTHESIOLOGY

## 2019-04-29 PROCEDURE — 99999 PR PBB SHADOW E&M-EST. PATIENT-LVL II: ICD-10-PCS | Mod: PBBFAC,,, | Performed by: OBSTETRICS & GYNECOLOGY

## 2019-04-29 PROCEDURE — 59514 CESAREAN DELIVERY ONLY: CPT | Mod: ,,, | Performed by: ANESTHESIOLOGY

## 2019-04-29 PROCEDURE — 58611 PR LIGATION,FALLOPIAN TUBE W/C-SECTION: ICD-10-PCS | Mod: ,,, | Performed by: OBSTETRICS & GYNECOLOGY

## 2019-04-29 PROCEDURE — 37000009 HC ANESTHESIA EA ADD 15 MINS: Performed by: OBSTETRICS & GYNECOLOGY

## 2019-04-29 PROCEDURE — 99999 PR PBB SHADOW E&M-EST. PATIENT-LVL II: CPT | Mod: PBBFAC,,, | Performed by: OBSTETRICS & GYNECOLOGY

## 2019-04-29 PROCEDURE — 36004725 HC OB OR TIME LEV III - EA ADD 15 MIN: Performed by: OBSTETRICS & GYNECOLOGY

## 2019-04-29 PROCEDURE — 58611 LIGATE OVIDUCT(S) ADD-ON: CPT | Mod: ,,, | Performed by: OBSTETRICS & GYNECOLOGY

## 2019-04-29 PROCEDURE — S0020 INJECTION, BUPIVICAINE HYDRO: HCPCS | Performed by: ANESTHESIOLOGY

## 2019-04-29 PROCEDURE — 99213 PR OFFICE/OUTPT VISIT, EST, LEVL III, 20-29 MIN: ICD-10-PCS | Mod: TH,S$PBB,, | Performed by: OBSTETRICS & GYNECOLOGY

## 2019-04-29 PROCEDURE — 85025 COMPLETE CBC W/AUTO DIFF WBC: CPT

## 2019-04-29 PROCEDURE — 99212 OFFICE O/P EST SF 10 MIN: CPT | Mod: PBBFAC,TH,PO | Performed by: OBSTETRICS & GYNECOLOGY

## 2019-04-29 PROCEDURE — 71000039 HC RECOVERY, EACH ADD'L HOUR: Performed by: OBSTETRICS & GYNECOLOGY

## 2019-04-29 PROCEDURE — 59514 PRA REAN DELIVERY ONLY: ICD-10-PCS | Mod: ,,, | Performed by: ANESTHESIOLOGY

## 2019-04-29 PROCEDURE — 11000001 HC ACUTE MED/SURG PRIVATE ROOM

## 2019-04-29 PROCEDURE — 25000003 PHARM REV CODE 250: Performed by: OBSTETRICS & GYNECOLOGY

## 2019-04-29 PROCEDURE — 63600175 PHARM REV CODE 636 W HCPCS: Performed by: STUDENT IN AN ORGANIZED HEALTH CARE EDUCATION/TRAINING PROGRAM

## 2019-04-29 PROCEDURE — 25000003 PHARM REV CODE 250: Performed by: STUDENT IN AN ORGANIZED HEALTH CARE EDUCATION/TRAINING PROGRAM

## 2019-04-29 PROCEDURE — 36004724 HC OB OR TIME LEV III - 1ST 15 MIN: Performed by: OBSTETRICS & GYNECOLOGY

## 2019-04-29 PROCEDURE — 88302 TISSUE EXAM BY PATHOLOGIST: CPT | Performed by: PATHOLOGY

## 2019-04-29 PROCEDURE — 99213 OFFICE O/P EST LOW 20 MIN: CPT | Mod: TH,S$PBB,, | Performed by: OBSTETRICS & GYNECOLOGY

## 2019-04-29 PROCEDURE — 88302 TISSUE SPECIMEN TO PATHOLOGY - SURGERY: ICD-10-PCS | Mod: 26,,, | Performed by: PATHOLOGY

## 2019-04-29 PROCEDURE — 37000008 HC ANESTHESIA 1ST 15 MINUTES: Performed by: OBSTETRICS & GYNECOLOGY

## 2019-04-29 RX ORDER — CEFAZOLIN SODIUM 2 G/50ML
2 SOLUTION INTRAVENOUS
Status: COMPLETED | OUTPATIENT
Start: 2019-04-29 | End: 2019-04-29

## 2019-04-29 RX ORDER — MUPIROCIN 20 MG/G
1 OINTMENT TOPICAL 2 TIMES DAILY
Status: DISCONTINUED | OUTPATIENT
Start: 2019-04-29 | End: 2019-05-01 | Stop reason: HOSPADM

## 2019-04-29 RX ORDER — HYDROCODONE BITARTRATE AND ACETAMINOPHEN 10; 325 MG/1; MG/1
1 TABLET ORAL EVERY 4 HOURS PRN
Status: DISCONTINUED | OUTPATIENT
Start: 2019-04-30 | End: 2019-05-01 | Stop reason: HOSPADM

## 2019-04-29 RX ORDER — BISACODYL 10 MG
10 SUPPOSITORY, RECTAL RECTAL ONCE AS NEEDED
Status: DISCONTINUED | OUTPATIENT
Start: 2019-04-29 | End: 2019-05-01 | Stop reason: HOSPADM

## 2019-04-29 RX ORDER — ONDANSETRON 2 MG/ML
INJECTION INTRAMUSCULAR; INTRAVENOUS
Status: DISCONTINUED | OUTPATIENT
Start: 2019-04-29 | End: 2019-04-30

## 2019-04-29 RX ORDER — OXYTOCIN/RINGER'S LACTATE 20/1000 ML
41.7 PLASTIC BAG, INJECTION (ML) INTRAVENOUS CONTINUOUS
Status: DISPENSED | OUTPATIENT
Start: 2019-04-29 | End: 2019-04-29

## 2019-04-29 RX ORDER — PHENYLEPHRINE HYDROCHLORIDE 10 MG/ML
INJECTION INTRAVENOUS
Status: DISCONTINUED | OUTPATIENT
Start: 2019-04-29 | End: 2019-04-30

## 2019-04-29 RX ORDER — LIDOCAINE HCL/EPINEPHRINE/PF 2%-1:200K
VIAL (ML) INJECTION
Status: DISCONTINUED | OUTPATIENT
Start: 2019-04-29 | End: 2019-04-30

## 2019-04-29 RX ORDER — DIPHENHYDRAMINE HYDROCHLORIDE 50 MG/ML
25 INJECTION INTRAMUSCULAR; INTRAVENOUS EVERY 4 HOURS PRN
Status: DISCONTINUED | OUTPATIENT
Start: 2019-04-29 | End: 2019-05-01 | Stop reason: HOSPADM

## 2019-04-29 RX ORDER — AMOXICILLIN 250 MG
1 CAPSULE ORAL NIGHTLY PRN
Status: DISCONTINUED | OUTPATIENT
Start: 2019-04-29 | End: 2019-05-01 | Stop reason: HOSPADM

## 2019-04-29 RX ORDER — OXYCODONE HYDROCHLORIDE 5 MG/1
10 TABLET ORAL EVERY 4 HOURS PRN
Status: ACTIVE | OUTPATIENT
Start: 2019-04-29 | End: 2019-04-30

## 2019-04-29 RX ORDER — BUPIVACAINE HYDROCHLORIDE 7.5 MG/ML
INJECTION, SOLUTION EPIDURAL; RETROBULBAR
Status: DISCONTINUED | OUTPATIENT
Start: 2019-04-29 | End: 2019-04-30

## 2019-04-29 RX ORDER — MUPIROCIN 20 MG/G
OINTMENT TOPICAL
Status: CANCELLED | OUTPATIENT
Start: 2019-04-29

## 2019-04-29 RX ORDER — ONDANSETRON 8 MG/1
8 TABLET, ORALLY DISINTEGRATING ORAL EVERY 8 HOURS PRN
Status: CANCELLED | OUTPATIENT
Start: 2019-04-29

## 2019-04-29 RX ORDER — SODIUM CHLORIDE, SODIUM LACTATE, POTASSIUM CHLORIDE, CALCIUM CHLORIDE 600; 310; 30; 20 MG/100ML; MG/100ML; MG/100ML; MG/100ML
INJECTION, SOLUTION INTRAVENOUS CONTINUOUS
Status: DISCONTINUED | OUTPATIENT
Start: 2019-04-29 | End: 2019-05-01 | Stop reason: HOSPADM

## 2019-04-29 RX ORDER — DIPHENHYDRAMINE HCL 25 MG
25 CAPSULE ORAL EVERY 4 HOURS PRN
Status: DISCONTINUED | OUTPATIENT
Start: 2019-04-29 | End: 2019-05-01 | Stop reason: HOSPADM

## 2019-04-29 RX ORDER — HYDROCORTISONE 25 MG/G
CREAM TOPICAL 3 TIMES DAILY PRN
Status: DISCONTINUED | OUTPATIENT
Start: 2019-04-29 | End: 2019-05-01 | Stop reason: HOSPADM

## 2019-04-29 RX ORDER — FENTANYL CITRATE 50 UG/ML
INJECTION, SOLUTION INTRAMUSCULAR; INTRAVENOUS
Status: DISCONTINUED | OUTPATIENT
Start: 2019-04-29 | End: 2019-04-30

## 2019-04-29 RX ORDER — ADHESIVE BANDAGE
30 BANDAGE TOPICAL 2 TIMES DAILY PRN
Status: DISCONTINUED | OUTPATIENT
Start: 2019-04-30 | End: 2019-05-01 | Stop reason: HOSPADM

## 2019-04-29 RX ORDER — ONDANSETRON 2 MG/ML
4 INJECTION INTRAMUSCULAR; INTRAVENOUS EVERY 6 HOURS PRN
Status: ACTIVE | OUTPATIENT
Start: 2019-04-29 | End: 2019-04-30

## 2019-04-29 RX ORDER — MISOPROSTOL 200 UG/1
800 TABLET ORAL
Status: DISCONTINUED | OUTPATIENT
Start: 2019-04-29 | End: 2019-05-01 | Stop reason: HOSPADM

## 2019-04-29 RX ORDER — SODIUM CITRATE AND CITRIC ACID MONOHYDRATE 334; 500 MG/5ML; MG/5ML
30 SOLUTION ORAL
Status: DISCONTINUED | OUTPATIENT
Start: 2019-04-29 | End: 2019-05-01 | Stop reason: HOSPADM

## 2019-04-29 RX ORDER — IBUPROFEN 600 MG/1
600 TABLET ORAL EVERY 6 HOURS
Status: DISCONTINUED | OUTPATIENT
Start: 2019-05-01 | End: 2019-05-01 | Stop reason: HOSPADM

## 2019-04-29 RX ORDER — SIMETHICONE 80 MG
1 TABLET,CHEWABLE ORAL EVERY 6 HOURS PRN
Status: DISCONTINUED | OUTPATIENT
Start: 2019-04-29 | End: 2019-05-01 | Stop reason: HOSPADM

## 2019-04-29 RX ORDER — ACETAMINOPHEN 325 MG/1
650 TABLET ORAL EVERY 6 HOURS
Status: DISPENSED | OUTPATIENT
Start: 2019-04-30 | End: 2019-05-01

## 2019-04-29 RX ORDER — ACETAMINOPHEN 325 MG/1
650 TABLET ORAL EVERY 6 HOURS PRN
Status: DISCONTINUED | OUTPATIENT
Start: 2019-04-29 | End: 2019-05-01 | Stop reason: HOSPADM

## 2019-04-29 RX ORDER — HYDROCODONE BITARTRATE AND ACETAMINOPHEN 5; 325 MG/1; MG/1
1 TABLET ORAL EVERY 4 HOURS PRN
Status: DISCONTINUED | OUTPATIENT
Start: 2019-04-30 | End: 2019-05-01 | Stop reason: HOSPADM

## 2019-04-29 RX ORDER — OXYTOCIN/RINGER'S LACTATE 20/1000 ML
41.65 PLASTIC BAG, INJECTION (ML) INTRAVENOUS CONTINUOUS
Status: ACTIVE | OUTPATIENT
Start: 2019-04-29 | End: 2019-04-30

## 2019-04-29 RX ORDER — MORPHINE SULFATE 0.5 MG/ML
INJECTION, SOLUTION EPIDURAL; INTRATHECAL; INTRAVENOUS
Status: DISCONTINUED | OUTPATIENT
Start: 2019-04-29 | End: 2019-04-30

## 2019-04-29 RX ORDER — OXYTOCIN/RINGER'S LACTATE 20/1000 ML
333 PLASTIC BAG, INJECTION (ML) INTRAVENOUS CONTINUOUS
Status: ACTIVE | OUTPATIENT
Start: 2019-04-29 | End: 2019-04-29

## 2019-04-29 RX ORDER — OXYCODONE HYDROCHLORIDE 5 MG/1
5 TABLET ORAL EVERY 4 HOURS PRN
Status: DISPENSED | OUTPATIENT
Start: 2019-04-29 | End: 2019-04-30

## 2019-04-29 RX ORDER — KETOROLAC TROMETHAMINE 30 MG/ML
30 INJECTION, SOLUTION INTRAMUSCULAR; INTRAVENOUS EVERY 6 HOURS
Status: COMPLETED | OUTPATIENT
Start: 2019-04-30 | End: 2019-04-30

## 2019-04-29 RX ORDER — SODIUM CHLORIDE, SODIUM LACTATE, POTASSIUM CHLORIDE, CALCIUM CHLORIDE 600; 310; 30; 20 MG/100ML; MG/100ML; MG/100ML; MG/100ML
INJECTION, SOLUTION INTRAVENOUS CONTINUOUS
Status: ACTIVE | OUTPATIENT
Start: 2019-04-29 | End: 2019-04-30

## 2019-04-29 RX ADMIN — MUPIROCIN 1 G: 20 OINTMENT TOPICAL at 11:04

## 2019-04-29 RX ADMIN — Medication 0.15 MG: at 08:04

## 2019-04-29 RX ADMIN — LIDOCAINE HYDROCHLORIDE,EPINEPHRINE BITARTRATE 5 ML: 20; .005 INJECTION, SOLUTION EPIDURAL; INFILTRATION; INTRACAUDAL; PERINEURAL at 09:04

## 2019-04-29 RX ADMIN — BUPIVACAINE HYDROCHLORIDE 1.5 ML: 7.5 INJECTION, SOLUTION EPIDURAL; RETROBULBAR at 08:04

## 2019-04-29 RX ADMIN — SODIUM CHLORIDE, SODIUM LACTATE, POTASSIUM CHLORIDE, AND CALCIUM CHLORIDE: .6; .31; .03; .02 INJECTION, SOLUTION INTRAVENOUS at 10:04

## 2019-04-29 RX ADMIN — Medication 41.65 MILLI-UNITS/MIN: at 10:04

## 2019-04-29 RX ADMIN — PHENYLEPHRINE HYDROCHLORIDE 75 MCG: 10 INJECTION INTRAVENOUS at 08:04

## 2019-04-29 RX ADMIN — SODIUM CITRATE AND CITRIC ACID MONOHYDRATE 30 ML: 500; 334 SOLUTION ORAL at 07:04

## 2019-04-29 RX ADMIN — CEFAZOLIN SODIUM 2 G: 2 SOLUTION INTRAVENOUS at 07:04

## 2019-04-29 RX ADMIN — ONDANSETRON 4 MG: 2 INJECTION INTRAMUSCULAR; INTRAVENOUS at 09:04

## 2019-04-29 RX ADMIN — SODIUM CHLORIDE, SODIUM LACTATE, POTASSIUM CHLORIDE, AND CALCIUM CHLORIDE: .6; .31; .03; .02 INJECTION, SOLUTION INTRAVENOUS at 07:04

## 2019-04-29 RX ADMIN — SODIUM CHLORIDE, SODIUM LACTATE, POTASSIUM CHLORIDE, AND CALCIUM CHLORIDE 1000 ML: .6; .31; .03; .02 INJECTION, SOLUTION INTRAVENOUS at 11:04

## 2019-04-29 RX ADMIN — FENTANYL CITRATE 10 MCG: 50 INJECTION, SOLUTION INTRAMUSCULAR; INTRAVENOUS at 08:04

## 2019-04-29 RX ADMIN — PHENYLEPHRINE HYDROCHLORIDE 100 MCG: 10 INJECTION INTRAVENOUS at 09:04

## 2019-04-29 NOTE — PROGRESS NOTES
Complaints today:    Here for follow up. AGAIN, did NOT bring logs. She does report however that over the last 4 days she has had elevated blood glucoses. Fasting has been 105-120 (was previously < 95) and 2 hr PP have been in 130 (previously only as high as 122). Last week had reassuring BPP.     Pt has no complaints. Denies vb, lof, or contractions. Good fetal movement.       /80   Wt 76.6 kg (168 lb 14 oz)   LMP 2018   BMI 32.98 kg/m²     33 y.o., at 39w2d by Estimated Date of Delivery: 19  Patient Active Problem List   Diagnosis    Maternal care for scar from previous  delivery    Pregnancy with poor reproductive history, antepartum    Diet controlled gestational diabetes mellitus (GDM), antepartum    Encounter for sterilization     OB History    Para Term  AB Living   3 2       2   SAB TAB Ectopic Multiple Live Births           2      # Outcome Date GA Lbr Adam/2nd Weight Sex Delivery Anes PTL Lv   3 Current            2 Para            1 Para                Dating reviewed    Allergies and problem list reviewed and updated    Medical and surgical history reviewed    Prenatal labs reviewed and updated    Physical Exam:  ABD: soft, gravid, nontender, +FHT    Assessment:  IUP at 39w2d   AMA  Diet controlled GDM     Plan:     Discussed case with [facility]. Diet controlled GDM, however no logs in many weeks. Has reassuring BPP one week ago and random glucose of 127. Today, reporting elevated blood glucoses when was previously reporting normal (see HPI). [facility] recommends delivery due to poor compliance and elevated blood glucoses. Discussed with patient. Last ate at 12:30 PM. Can have RLTCS + BTL at 8:30 PM. Advised to stay NPO. Will go home and gather her belogings and report to L&D for direct admit. Notified L&D Team.     Abby Brush MD    Ob-Gyn

## 2019-04-30 PROBLEM — D62 ACUTE POSTHEMORRHAGIC ANEMIA: Status: ACTIVE | Noted: 2019-04-30

## 2019-04-30 LAB
BASOPHILS # BLD AUTO: 0.01 K/UL (ref 0–0.2)
BASOPHILS NFR BLD: 0.1 % (ref 0–1.9)
DIFFERENTIAL METHOD: ABNORMAL
EOSINOPHIL # BLD AUTO: 0 K/UL (ref 0–0.5)
EOSINOPHIL NFR BLD: 0 % (ref 0–8)
ERYTHROCYTE [DISTWIDTH] IN BLOOD BY AUTOMATED COUNT: 15.1 % (ref 11.5–14.5)
GLUCOSE SERPL-MCNC: 90 MG/DL (ref 70–110)
HCT VFR BLD AUTO: 28.8 % (ref 37–48.5)
HGB BLD-MCNC: 9 G/DL (ref 12–16)
LYMPHOCYTES # BLD AUTO: 1.9 K/UL (ref 1–4.8)
LYMPHOCYTES NFR BLD: 18.3 % (ref 18–48)
MCH RBC QN AUTO: 26.8 PG (ref 27–31)
MCHC RBC AUTO-ENTMCNC: 31.3 G/DL (ref 32–36)
MCV RBC AUTO: 86 FL (ref 82–98)
MONOCYTES # BLD AUTO: 0.6 K/UL (ref 0.3–1)
MONOCYTES NFR BLD: 6.1 % (ref 4–15)
NEUTROPHILS # BLD AUTO: 7.6 K/UL (ref 1.8–7.7)
NEUTROPHILS NFR BLD: 75 % (ref 38–73)
PLATELET # BLD AUTO: 162 K/UL (ref 150–350)
PMV BLD AUTO: 11.8 FL (ref 9.2–12.9)
RBC # BLD AUTO: 3.36 M/UL (ref 4–5.4)
WBC # BLD AUTO: 10.17 K/UL (ref 3.9–12.7)

## 2019-04-30 PROCEDURE — 99231 PR SUBSEQUENT HOSPITAL CARE,LEVL I: ICD-10-PCS | Mod: ,,, | Performed by: OBSTETRICS & GYNECOLOGY

## 2019-04-30 PROCEDURE — 25000003 PHARM REV CODE 250: Performed by: STUDENT IN AN ORGANIZED HEALTH CARE EDUCATION/TRAINING PROGRAM

## 2019-04-30 PROCEDURE — 82947 ASSAY GLUCOSE BLOOD QUANT: CPT

## 2019-04-30 PROCEDURE — 63600175 PHARM REV CODE 636 W HCPCS: Performed by: STUDENT IN AN ORGANIZED HEALTH CARE EDUCATION/TRAINING PROGRAM

## 2019-04-30 PROCEDURE — 99231 SBSQ HOSP IP/OBS SF/LOW 25: CPT | Mod: ,,, | Performed by: OBSTETRICS & GYNECOLOGY

## 2019-04-30 PROCEDURE — 11000001 HC ACUTE MED/SURG PRIVATE ROOM

## 2019-04-30 PROCEDURE — 36415 COLL VENOUS BLD VENIPUNCTURE: CPT

## 2019-04-30 PROCEDURE — 25000003 PHARM REV CODE 250: Performed by: OBSTETRICS & GYNECOLOGY

## 2019-04-30 PROCEDURE — 85025 COMPLETE CBC W/AUTO DIFF WBC: CPT

## 2019-04-30 RX ORDER — IBUPROFEN 600 MG/1
600 TABLET ORAL EVERY 6 HOURS
Qty: 30 TABLET | Refills: 1 | Status: SHIPPED | OUTPATIENT
Start: 2019-05-01 | End: 2019-05-01

## 2019-04-30 RX ORDER — HYDROCODONE BITARTRATE AND ACETAMINOPHEN 5; 325 MG/1; MG/1
1 TABLET ORAL EVERY 4 HOURS PRN
Qty: 20 TABLET | Refills: 0 | Status: SHIPPED | OUTPATIENT
Start: 2019-04-30 | End: 2019-05-01

## 2019-04-30 RX ORDER — DOCUSATE SODIUM 100 MG/1
100 CAPSULE, LIQUID FILLED ORAL 2 TIMES DAILY
Status: DISCONTINUED | OUTPATIENT
Start: 2019-04-30 | End: 2019-05-01 | Stop reason: HOSPADM

## 2019-04-30 RX ORDER — IRON POLYSACCHARIDE COMPLEX 150 MG
150 CAPSULE ORAL DAILY
Status: DISCONTINUED | OUTPATIENT
Start: 2019-04-30 | End: 2019-05-01 | Stop reason: HOSPADM

## 2019-04-30 RX ADMIN — DOCUSATE SODIUM 100 MG: 100 CAPSULE, LIQUID FILLED ORAL at 09:04

## 2019-04-30 RX ADMIN — ACETAMINOPHEN 650 MG: 325 TABLET ORAL at 05:04

## 2019-04-30 RX ADMIN — DIPHENHYDRAMINE HYDROCHLORIDE 25 MG: 25 CAPSULE ORAL at 05:04

## 2019-04-30 RX ADMIN — OXYCODONE HYDROCHLORIDE 5 MG: 5 TABLET ORAL at 12:04

## 2019-04-30 RX ADMIN — KETOROLAC TROMETHAMINE 30 MG: 30 INJECTION, SOLUTION INTRAMUSCULAR at 05:04

## 2019-04-30 RX ADMIN — SODIUM CHLORIDE, SODIUM LACTATE, POTASSIUM CHLORIDE, AND CALCIUM CHLORIDE: .6; .31; .03; .02 INJECTION, SOLUTION INTRAVENOUS at 01:04

## 2019-04-30 RX ADMIN — OXYCODONE HYDROCHLORIDE 5 MG: 5 TABLET ORAL at 02:04

## 2019-04-30 RX ADMIN — Medication 150 MG: at 09:04

## 2019-04-30 RX ADMIN — ACETAMINOPHEN 650 MG: 325 TABLET ORAL at 12:04

## 2019-04-30 RX ADMIN — DOCUSATE SODIUM 100 MG: 100 CAPSULE, LIQUID FILLED ORAL at 10:04

## 2019-04-30 RX ADMIN — PROMETHAZINE HYDROCHLORIDE 6.25 MG: 25 INJECTION INTRAMUSCULAR; INTRAVENOUS at 01:04

## 2019-04-30 RX ADMIN — KETOROLAC TROMETHAMINE 30 MG: 30 INJECTION, SOLUTION INTRAMUSCULAR at 12:04

## 2019-04-30 NOTE — PHYSICIAN QUERY
"PT Name: Richard Gonzalez  MR #: [id number]    Physician Query Form - Hematology Clarification      CDS/: JEFF Humphrey,[email or web address]         Contact information:[email or web address]    This form is a permanent document in the medical record.      Query Date: 2019    By submitting this query, we are merely seeking further clarification of documentation. Please utilize your independent clinical judgment when addressing the question(s) below.    The Medical record contains the following:   Indicators  Supporting Clinical Findings Location in Medical Record   X "Anemia" documented Anemia OB Progress note @908am   X H & H = Hgb=9.0-11.2  Hct=28.8-34.8 LAB -    BP =                     HR=      "GI bleeding" documented     X Acute bleeding (Non GI site) Qualitative Blood Loss:  495 mL L&D Delivery note     Transfusion(s)     X Treatment: -Iron and colace prescribed    OB Progress note @908am   X Other:  Procedure:   1. Repeat  Section via pfannensteil skin incision  2. Bilateral tubal ligation L&D Delivery note      Provider, please specify diagnosis or diagnoses associated with above clinical findings.    [x  ] Acute blood loss anemia expected post-operatively   [  ] Acute blood loss anemia   [  ] Iron deficiency anemia     [  ] Other Hematological Diagnosis (please specify):     [  ] Clinically Undetermined       Please document in your progress notes daily for the duration of treatment, until resolved, and include in your discharge summary.                                                                                                      "

## 2019-04-30 NOTE — L&D DELIVERY NOTE
Section Procedure Note    Procedure:   1. Repeat  Section via pfannensteil skin incision  2. Bilateral tubal ligation    Indications:   1. h/o  x2  2. Poorly compliant diet controlled GDM ([facility] recommended delivery)    Pre-operative Diagnosis:   1. IUP at 39 week 2 day pregnancy  2. GDM (diet controlled)  3. Desired sterilization    Post-operative Diagnosis:   Same  S/p bilateral tubal ligation    Surgeon: Mercy Nguyen MD     Assistants: Jarad Giang MD - PGY4, Masha Dennison MD - PGY1    Anesthesia: Spinal anesthesia    Findings:    1. Single viable  female infant, with APGARS 8/9, weight 3420g.   2. Normal appearing uterus, ovaries, and fallopian tubes.  3. Omental adhesion to anterior surface of uterus and anterior abdominal wall   4. Normal placenta    Qualitative Blood Loss:  495 mL           Total IV Fluids: 1000 mL     UOP: 250 mL    Specimens: bilateral fallopian tube segments    PreOp CBC:   Lab Results   Component Value Date    WBC 7.32 2019    HGB 11.2 (L) 2019    HCT 34.8 (L) 2019    MCV 85 2019     2019                     Complications:  None; patient tolerated the procedure well.           Disposition: PACU - hemodynamically stable.           Condition: stable    Procedure Details   Beautylish translation service was available during the entire procedure.    The patient was seen in the Holding Room. The risks, benefits, complications, treatment options, and expected outcomes were discussed with the patient.  The patient concurred with the proposed plan, giving informed consent.  The patient was taken to Operating Room, identified as Richard Gonzalez and the procedure verified as  Delivery. A Time Out was held and the above information confirmed.    After induction of anesthesia, the patient was prepped and draped in the usual sterile manner while placed in a dorsal supine position with a left lateral tilt.  A lamas catheter  was also placed per nursing. Preoperative antibiotics Ancef 2 g was administered. An allis test was performed confirming adequate anesthesia.  A Pfannenstiel incision was made and carried down through the subcutaneous tissue to the fascia. Fascial incision was made and extended transversely. The fascia was grasped with [facility] clamps and  from the underlying rectus tissue superiorly and inferiorly. Dense musculofascial adhesions from prior  section were encountered and taken down with curved Vasquez scissors and bovi cauterization. The rectus muscle was transected approximately 2-3cm bilaterally to allow for better visualization. The omentum was found to be densely adhered to anterior uterus and anterior abdominal wall Omental adhesions were taken down with monopolar cauterization, and the uterus was then able to be fully visualized. The bladder was densely adhered to the lower uterine segment. Bladder adhesions were then taken down with metzenbaum scissors, monopolar cauterization and blunt dissection, with no evident damage to the dome of the bladder. The bladder blade was inserted to keep the bladder out of the operative field. A low transverse uterine incision was made with knife and extended with finger fracture. The amniotic sac was ruptured with stats and the infant was noted to be in vertex presentation. The uterine incision was extended laterally using bandage scissors at the right angle to allow for more room for delivery of the fetal head. The head was then brought to the incision and elevated out of the pelvis. The patient delivered a single viable female infant without difficulty.   After the umbilical cord was clamped and cut, cord blood was obtained for evaluation. The placenta was removed intact, appeared normal, and was discarded. The uterus was exteriorized. The uterine incision was closed with running locked sutures of 0 vicryl. Small areas of bleeding were noted on the superior and  inferior border of the hysterotomy. Hemostasis was achieved with three figure of eight sutures of 0 vicryl. The uterine outline, tubes and ovaries appeared normal. Attention was then turned to the right fallopian tube which was grasped with a traece clamp and followed to the fimbriated end for verification. An opening was made in an avascular window of the mesosalpinx using monopolar cautery, and a 2 cm section of tube was doubly ligated and excised using the Tyrone method. Good hemostasis was noted. The same was repeated on the left side with good hemostasis again noted. Tubal segments were sent to pathology for confirmation. The uterus was then returned to the abdominal cavity. Incision was reinspected with small area of bleeding noted on the inferior left border of the incision. Hemostasis was achieved with one figure of eight suture of 2-0 vicryl. Clots were removed from the abdominal cavity with a moistened lap. Incised muscle was noted to be hemostatic and retracted and not amenable to re approximation. Good hemostasis was noted on the surface of the rectus muscle. The fascia was then reapproximated with running sutures of #0 PDS. The subcutaneous fat was reapproximated with 2-0 vicryl, and skin was reapproximated with 4-0 monocryl.    Instrument, sponge, and needle counts were correct prior the abdominal closure and at the conclusion of the case.     Pt tolerated procedure well and was in stable condition after the procedure.    Masha Dennison MD   OBGYN, PGY-1    I WAS SCRUBBED AND PRESENT THROUGHOUT THE DELIVERY     Delivery Information for  Huyen Gonzalez    Birth information:  YOB: 2021   Time of birth: 9:01 PM   Sex: female   Head Delivery Date/Time: 2019  9:01 PM   Delivery type: , Low Transverse   Gestational Age: 39w2d    Delivery Providers    Delivering clinician:  Mercy Nguyen MD   Provider Role    B. MD Masha Marie MD      FELIX Gonzalez,      Aminata Abadie, RN             Measurements    Weight:  3420 g  Length:  49.5 cm  Head circumference:  36.8 cm  Chest circumference:  34.3 cm         Apgars    Living status:  Living  Apgars:   1 min.:   5 min.:   10 min.:   15 min.:   20 min.:     Skin color:   0  1       Heart rate:   2  2       Reflex irritability:   2  2       Muscle tone:   2  2       Respiratory effort:   2  2       Total:   8  9       Apgars assigned by:  E. ABADIE,RN         Operative Delivery    Forceps attempted?:  No  Vacuum extractor attempted?:  No         Shoulder Dystocia    Shoulder dystocia present?:  No           Presentation    Presentation:  Vertex  Position:  Left Occiput Anterior           Interventions/Resuscitation    Method:  Bulb Suctioning, Tactile Stimulation       Cord    Vessels:  3 vessels  Complications:  None  Delayed Cord Clamping?:  No  Cord Blood Disposition:  Sent with Baby  Gases Sent?:  No  Stem Cell Collection (by MD):  No       Placenta    Placenta delivery date/time:  2019  Placenta removal:  Manual removal  Placenta appearance:  Intact  Placenta disposition:  discarded           Labor Events:       labor: No     Labor Onset Date/Time:         Dilation Complete Date/Time:         Start Pushing Date/Time:       Rupture Date/Time:              Rupture type:           Fluid Amount:        Fluid Color:        Fluid Odor:        Membrane Status (PeriCalm):        Rupture Date/Time (PeriCalm):        Fluid Amount (PeriCalm):        Fluid Color (PeriCalm):         steroids:       Antibiotics given for GBS: No     Induction:       Indications for induction:        Augmentation:       Indications for augmentation:       Labor complications: None     Additional complications:          Cervical ripening:                     Delivery:      Episiotomy: None     Indication for Episiotomy:       Perineal Lacerations: None Repaired:       Periurethral Laceration:   Repaired:     Labial Laceration:   Repaired:     Sulcus Laceration:   Repaired:     Vaginal Laceration:   Repaired:     Cervical Laceration:   Repaired:     Repair suture:       Repair # of packets:       Last Value - EBL - Nursing (mL):       Sum - EBL - Nursing (mL): 0     Last Value - EBL - Anesthesia (mL):      Calculated QBL (mL): 495      Vaginal Sweep Performed: No     Surgicount Correct: Yes       Other providers:       Anesthesia    Method:  Spinal          Details (if applicable):  Trial of Labor No    Categorization: Repeat    Priority: Routine   Indications for : Repeat Section   Incision Type: low transverse     Additional  information:  Forceps:    Vacuum:    Breech:    Observed anomalies    Other (Comments):

## 2019-04-30 NOTE — ANESTHESIA PROCEDURE NOTES
CSE    Patient location during procedure: OR  Start time: 4/29/2019 6:16 PM  Timeout: 4/29/2019 6:15 PM  End time: 4/29/2019 6:22 PM  Staffing  Anesthesiologist: Hyun García MD  Resident/CRNA: Hanny Galeano MD  Performed: resident/CRNA   Preanesthetic Checklist  Completed: patient identified, site marked, surgical consent, pre-op evaluation, timeout performed, IV checked, risks and benefits discussed and monitors and equipment checked  CSE  Patient position: sitting  Prep: ChloraPrep  Patient monitoring: frequent blood pressure checks, continuous pulse ox, cardiac monitor and heart rate  Approach: midline  Spinal Needle  Needle type: Jen   Needle gauge: 25 G  Needle length: 5 in  Epidural Needle  Injection technique: NICOLE air  Needle type: Tuohy   Needle gauge: 17 G  Needle length: 3.5 in  Needle insertion depth: 7 cm  Location: L3-4  Needle localization: anatomical landmarks  Catheter  Catheter type: springwound  Catheter size: 19 G  Catheter at skin depth: 11 cm  Test dose: lidocaine 1.5% with Epi 1-to-200,000  Additional Documentation: incremental injection, negative aspiration for CSF and negative aspiration for heme  Assessment  Sensory level: T10   Dermatomal levels determined by ice  Intrathecal Medications:  administered: primary anesthetic mcg of

## 2019-04-30 NOTE — ANESTHESIA POSTPROCEDURE EVALUATION
Anesthesia Post Evaluation    Patient: Richard Gonzalez    Procedure(s) Performed: Procedure(s) (LRB):   SECTION, WITH TUBAL LIGATION (N/A)    Final Anesthesia Type: CSE  Patient location during evaluation: floor  Patient participation: Yes- Able to Participate  Level of consciousness: awake and alert  Post-procedure vital signs: reviewed and stable  Pain management: adequate  Airway patency: patent  PONV status at discharge: No PONV  Anesthetic complications: no      Cardiovascular status: blood pressure returned to baseline and hemodynamically stable  Respiratory status: unassisted, spontaneous ventilation and room air  Hydration status: euvolemic  Follow-up not needed.          Vitals Value Taken Time   /69 2019  4:00 AM   Temp 37 °C (98.6 °F) 2019  4:00 AM   Pulse 67 2019  4:00 AM   Resp 16 2019  4:00 AM   SpO2 95 % 2019  4:00 AM         No case tracking events are documented in the log.      Pain/Juan Alberto Score: Pain Rating Prior to Med Admin: 6 (2019  5:13 AM)  Pain Rating Post Med Admin: 2 (2019  6:00 AM)

## 2019-04-30 NOTE — TRANSFER OF CARE
Anesthesia Transfer of Care Note    Patient: Richard Gonzalez    Procedure(s) Performed: Procedure(s) (LRB):   SECTION, WITH TUBAL LIGATION (N/A)    Patient location: Labor and Delivery    Anesthesia Type: CSE    Transport from OR: Transported from OR on room air with adequate spontaneous ventilation    Post pain: adequate analgesia    Post assessment: no apparent anesthetic complications    Post vital signs: stable    Level of consciousness: awake, alert and oriented    Nausea/Vomiting: no nausea/vomiting    Complications: none          Last vitals:   Visit Vitals  BP (!) 93/51   Pulse 70   Temp 36.6 °C (97.8 °F) (Oral)   Resp 18   Ht 5' (1.524 m)   Wt 77.1 kg (170 lb)   LMP 2018   SpO2 98%   Breastfeeding? No   BMI 33.20 kg/m²

## 2019-04-30 NOTE — ANESTHESIA PREPROCEDURE EVALUATION
Richard Gonzalez is a 33 y.o. female  female with IUP at 39w2d weeks gestation who presents for scheduled  secondary to GDM with poor compliance (MFM recommended). Pt desires BTL.  Patient reports irregular contractions, denies vaginal bleeding, denies LOF.   Fetal Movement: normal.  This IUP is complicated by h/o  x2, GDM (diet controlled, poor compliance), desired sterilization.     Interview performed with assistance of Alda , ID#[id number]        OB History    Para Term  AB Living   3 2       2   SAB TAB Ectopic Multiple Live Births           2      # Outcome Date GA Lbr Adam/2nd Weight Sex Delivery Anes PTL Lv   3 Current            2 Para            1 Para                Wt Readings from Last 1 Encounters:   19 1844 77.1 kg (170 lb)       BP Readings from Last 3 Encounters:   19 (!) 149/82   19 116/80   19 100/79       Patient Active Problem List   Diagnosis    Maternal care for scar from previous  delivery    Pregnancy with poor reproductive history, antepartum    Diet controlled gestational diabetes mellitus (GDM), antepartum    Encounter for sterilization    39 weeks gestation of pregnancy       Past Surgical History:   Procedure Laterality Date     SECTION      x2       Social History     Socioeconomic History    Marital status:      Spouse name: Not on file    Number of children: Not on file    Years of education: Not on file    Highest education level: Not on file   Occupational History    Not on file   Social Needs    Financial resource strain: Not on file    Food insecurity:     Worry: Not on file     Inability: Not on file    Transportation needs:     Medical: Not on file     Non-medical: Not on file   Tobacco Use    Smoking status: Never Smoker    Smokeless tobacco: Never Used   Substance and Sexual Activity    Alcohol use: No     Frequency: Never    Drug use: No    Sexual activity: Not  Currently     Partners: Male     Birth control/protection: None   Lifestyle    Physical activity:     Days per week: Not on file     Minutes per session: Not on file    Stress: Not on file   Relationships    Social connections:     Talks on phone: Not on file     Gets together: Not on file     Attends Yazdanism service: Not on file     Active member of club or organization: Not on file     Attends meetings of clubs or organizations: Not on file     Relationship status: Not on file   Other Topics Concern    Not on file   Social History Narrative    Not on file         Chemistry    No results found for: NA, K, CL, CO2, BUN, CREATININE, GLU No results found for: CALCIUM, ALKPHOS, AST, ALT, BILITOT, ESTGFRAFRICA, EGFRNONAA         Lab Results   Component Value Date    WBC 7.32 04/29/2019    HGB 11.2 (L) 04/29/2019    HCT 34.8 (L) 04/29/2019    MCV 85 04/29/2019     04/29/2019       No results for input(s): PT, INR, PROTIME, APTT in the last 72 hours.                  Anesthesia Evaluation    I have reviewed the Patient Summary Reports.    I have reviewed the Nursing Notes.   I have reviewed the Medications.     Review of Systems  Anesthesia Hx:  No problems with previous Anesthesia    Social:  Non-Smoker    Hematology/Oncology:  Hematology Normal   Oncology Normal     EENT/Dental:EENT/Dental Normal   Cardiovascular:  Cardiovascular Normal     Pulmonary:  Pulmonary Normal    Renal/:  Renal/ Normal     Hepatic/GI:  Hepatic/GI Normal    Neurological:  Neurology Normal    Endocrine:   Diabetes, gestational, using insulin        Physical Exam  General:  Well nourished    Airway/Jaw/Neck:  Airway Findings: Mouth Opening: Normal Tongue: Normal  General Airway Assessment: Adult, Average  Improves to III with phonation.  TM Distance: Normal, at least 6 cm  Jaw/Neck Findings:  Neck ROM: Normal ROM      Dental:  Dental Findings: In tact   Chest/Lungs:  Chest/Lungs Clear    Heart/Vascular:  Heart Findings: Normal  Heart murmur: negative       Mental Status:  Mental Status Findings:  Cooperative, Alert and Oriented         Anesthesia Plan  Type of Anesthesia, risks & benefits discussed:  Anesthesia Type:  CSE, epidural, general, MAC, spinal  Patient's Preference:   Intra-op Monitoring Plan: standard ASA monitors  Intra-op Monitoring Plan Comments:   Post Op Pain Control Plan: per primary service following discharge from PACU, intrathecal opioid, epidural analgesia, IV/PO Opioids PRN and multimodal analgesia  Post Op Pain Control Plan Comments:   Induction:   IV  Beta Blocker:         Informed Consent: Patient understands risks and agrees with Anesthesia plan.  Questions answered. Anesthesia consent signed with patient.  ASA Score: 2     Day of Surgery Review of History & Physical:            Ready For Surgery From Anesthesia Perspective.

## 2019-04-30 NOTE — PROGRESS NOTES
POSTPARTUM PROGRESS NOTE     Richard Gonzalez is a 33 y.o. female POD #1 status post RLTCS and BTL at 39w2d in a pregnancy complicated by poorly controlled GDM and obesity.   She is doing well this morning, and reports moderate abdominal pain that is relieved by oral pain medications. Vaginal bleeding is mild and stable. She is voiding without difficulty and is ambulating. She has not passed flatus. She denies nausea, vomiting, fever or chills. Patient does not plan to breast feed. She is now s/p BTL for contraception. She reports female infant in room with her is doing well also.    Objective:      Temp:  [97.8 °F (36.6 °C)-98.6 °F (37 °C)] 98.6 °F (37 °C)  Pulse:  [59-75] 67  Resp:  [16-18] 16  SpO2:  [95 %-100 %] 95 %  BP: ()/(50-83) 134/69     General:   Awake, alert, appears her stated age, no apparent distress   Lungs:   No evidence of respiratory distress. Quiet, unlabored breathing   Heart:   Regular rate and rhythm   Abdomen:  Appears nondistended. Soft. Nontender to palpation. Uterine fundus palpable at the umbilicus. Pfannensteil incision with clean, dry bandage in place, with mild/appropriate elizabeth-incisional tenderness   Extremities: Teds/SCDs in place. No LE edema.      Lab Review  Lab Results   Component Value Date    WBC 10.17 2019    HGB 9.0 (L) 2019    HCT 28.8 (L) 2019    MCV 86 2019     2019      Fasting Blood Glucose: 90    I/O    Intake/Output Summary (Last 24 hours) at 2019 0648  Last data filed at 2019 0500  Gross per 24 hour   Intake 143.75 ml   Output 1040 ml   Net -896.25 ml        Assessment:     Richard Gonzalez is a 33 y.o.  now POD#1 s/p RLTCS and BTL.     Patient Active Problem List   Diagnosis    Maternal care for scar from previous  delivery    Pregnancy with poor reproductive history, antepartum    Diet controlled gestational diabetes mellitus (GDM), antepartum    Encounter for sterilization    39 weeks  gestation of pregnancy     delivery, delivered, current hospitalization, with BTL     delivery delivered     Plan:     1. Routine postpartum care:  - VS WNL and stable since delivery. Continue to monitor every 4 hours  - Pre-delivery H/H , now .8 postpartum  - Lochia mild and stable - continue to monitor while inpatient  - Pain control achieved with PO medication - continue scheduled ibuprofen and PRN Norco 5 or 10mg  - Regular diet  - Encourage ambulation  - Lactation counseling following for bottle-feeding support  - Contraception - s/p BTL    2. Anemia  - Mild, with PP H/H .8  - Asymptomatic  - VSS and WNL  - Iron and colace prescribed     3. Gestational DM  - Managed with diet, however not well-controlled  - FBG 90 this morning  - 2-hour GTT postpartum    Dispo: As patient meets milestones, will plan to discharge on POD#3-4.    Shon Sauceda M.D.  Obstetrics & Gynecology  PGY-2

## 2019-04-30 NOTE — H&P
HISTORY AND PHYSICAL                                                OBSTETRICS          Subjective:       Richard Gonzalez is a 33 y.o.  female with IUP at 39w2d weeks gestation who presents for scheduled  secondary to GDM with poor compliance (MFM recommended). Pt desires BTL.    Patient reports irregular contractions, denies vaginal bleeding, denies LOF.   Fetal Movement: normal.    This IUP is complicated by h/o  x2, GDM (diet controlled, poor compliance), desired sterilization.    Interview performed with assistance of Alda , ID#[id number]    Review of Systems   Constitutional: Negative for chills and fever.   Eyes: Negative for visual disturbance.   Respiratory: Negative for shortness of breath.    Cardiovascular: Negative for chest pain and palpitations.   Gastrointestinal: Negative for abdominal pain, constipation, diarrhea, nausea and vomiting.   Genitourinary: Negative for vaginal bleeding and vaginal discharge.   Musculoskeletal: Negative for back pain.   Integumentary:  Negative for rash.   Neurological: Negative for seizures, syncope and headaches.   Hematological: Does not bruise/bleed easily.   Psychiatric/Behavioral: Negative for depression. The patient is not nervous/anxious.        PMHx:   Past Medical History:   Diagnosis Date    Gestational diabetes     Uses Portuguese as primary spoken language        PSHx:   Past Surgical History:   Procedure Laterality Date     SECTION      x2       All:   Review of patient's allergies indicates:   Allergen Reactions    Lactose Hives       Meds:   Medications Prior to Admission   Medication Sig Dispense Refill Last Dose    benzonatate (TESSALON PERLES) 100 MG capsule Take 1 capsule (100 mg total) by mouth daily as needed for Cough. 30 capsule 0 Taking    blood sugar diagnostic (CONTOUR TEST STRIPS) Strp 1 strip by Misc.(Non-Drug; Combo Route) route 4 (four) times daily. 100 strip 11 Taking    blood sugar diagnostic  (CONTOUR TEST STRIPS) Strp 1 strip by Misc.(Non-Drug; Combo Route) route 4 (four) times daily. 100 strip 11 Taking    blood-glucose meter Misc use as directed 1 each 0 Taking    lancets (ACCU-CHEK SOFTCLIX LANCETS) Misc Use four times daily 200 each 1 Taking    prenat.vits,niurka,min-iron-folic (PRENATAL VITAMIN) Tab Take 1 capsule by mouth once daily. 30 each 0 Taking       SH:   Social History     Socioeconomic History    Marital status:      Spouse name: Not on file    Number of children: Not on file    Years of education: Not on file    Highest education level: Not on file   Occupational History    Not on file   Social Needs    Financial resource strain: Not on file    Food insecurity:     Worry: Not on file     Inability: Not on file    Transportation needs:     Medical: Not on file     Non-medical: Not on file   Tobacco Use    Smoking status: Never Smoker    Smokeless tobacco: Never Used   Substance and Sexual Activity    Alcohol use: No     Frequency: Never    Drug use: No    Sexual activity: Not Currently     Partners: Male     Birth control/protection: None   Lifestyle    Physical activity:     Days per week: Not on file     Minutes per session: Not on file    Stress: Not on file   Relationships    Social connections:     Talks on phone: Not on file     Gets together: Not on file     Attends Pentecostalism service: Not on file     Active member of club or organization: Not on file     Attends meetings of clubs or organizations: Not on file     Relationship status: Not on file   Other Topics Concern    Not on file   Social History Narrative    Not on file       FH:   Family History   Problem Relation Age of Onset    Breast cancer Neg Hx     Colon cancer Neg Hx     Ovarian cancer Neg Hx        OBHx:   OB History    Para Term  AB Living   3 2 0 0 0 2   SAB TAB Ectopic Multiple Live Births   0 0 0 0 2      # Outcome Date GA Lbr Adam/2nd Weight Sex Delivery Anes PTL Lv   3  Current            2 Para            1 Para                Objective:       BP (!) 149/82   Pulse 69   Temp 98.1 °F (36.7 °C) (Oral)   Resp 18   Ht 5' (1.524 m)   Wt 77.1 kg (170 lb)   LMP 2018   SpO2 98%   Breastfeeding? No   BMI 33.20 kg/m²     Vitals:    19 1844 19 1848 19 1853 19 1858   BP:       Pulse:  65 66 69   Resp:       Temp:       TempSrc:       SpO2:  97% 97% 98%   Weight: 77.1 kg (170 lb)      Height: 5' (1.524 m)          General:   alert, appears stated age and cooperative, no apparent distress   HENT:  normocephalic, atraumatic   Eyes:  extraocular movements and conjunctivae normal   Neck:  supple, range of motion normal, no thyromegaly   Lungs:   no respiratory distress   Heart:   regular rate   Abdomen:  soft, non-tender, non-distended but gravid, no rebound or guarding    Extremities negative edema, negative erythema   FHT: 120, moderate BTBV, +accels, -decels;  Cat 1 (reassuring)                 TOCO: irregular   Presentations: cephalic by ultrasound   Cervix:  deferred       Lab Review  Blood Type B POS  GBBS: negative  Rubella: Immune  RPR: non-reactive  HIV: negative  HepB: negative       Assessment:       39w2d weeks gestation with scheduled     Active Hospital Problems    Diagnosis  POA    39 weeks gestation of pregnancy [Z3A.39]  Not Applicable      Resolved Hospital Problems   No resolved problems to display.          Plan:     1. Repeat LTCS  - Consents signed and to chart  - Admit to Labor and Delivery unit  - Epidural per Anesthesia  - Draw CBC, T&S  - Ancef OCTOR  - To OR for C/S. Case Request is in.   - Notify Staff  - Ultrasound performed, infant in vertex position.   - Post-Partum Hemorrhage risk - low    2. GDM  - diet controlled, poor compliance  - BG upon arrival 74  - fasting BG tomorrow AM    3. Desired sterilization  - consents signed for c/s with BTL, confirmed with patient  - medicaid consents in media dated  2/11/19.      Masha Dennison MD   OBN, PGY-1

## 2019-05-01 VITALS
OXYGEN SATURATION: 96 % | RESPIRATION RATE: 17 BRPM | WEIGHT: 170 LBS | HEIGHT: 60 IN | DIASTOLIC BLOOD PRESSURE: 72 MMHG | BODY MASS INDEX: 33.38 KG/M2 | TEMPERATURE: 99 F | SYSTOLIC BLOOD PRESSURE: 135 MMHG | HEART RATE: 76 BPM

## 2019-05-01 PROBLEM — Z3A.39 39 WEEKS GESTATION OF PREGNANCY: Status: RESOLVED | Noted: 2019-04-29 | Resolved: 2019-05-01

## 2019-05-01 PROCEDURE — 25000003 PHARM REV CODE 250: Performed by: OBSTETRICS & GYNECOLOGY

## 2019-05-01 PROCEDURE — 99238 HOSP IP/OBS DSCHRG MGMT 30/<: CPT | Mod: ,,, | Performed by: OBSTETRICS & GYNECOLOGY

## 2019-05-01 PROCEDURE — 25000003 PHARM REV CODE 250: Performed by: STUDENT IN AN ORGANIZED HEALTH CARE EDUCATION/TRAINING PROGRAM

## 2019-05-01 PROCEDURE — 99238 PR HOSPITAL DISCHARGE DAY,<30 MIN: ICD-10-PCS | Mod: ,,, | Performed by: OBSTETRICS & GYNECOLOGY

## 2019-05-01 RX ORDER — HYDROCODONE BITARTRATE AND ACETAMINOPHEN 5; 325 MG/1; MG/1
1 TABLET ORAL EVERY 4 HOURS PRN
Qty: 20 TABLET | Refills: 0 | Status: SHIPPED | OUTPATIENT
Start: 2019-05-01 | End: 2019-05-08

## 2019-05-01 RX ORDER — IBUPROFEN 600 MG/1
600 TABLET ORAL EVERY 6 HOURS
Qty: 30 TABLET | Refills: 1 | Status: SHIPPED | OUTPATIENT
Start: 2019-05-01 | End: 2019-05-08

## 2019-05-01 RX ADMIN — IBUPROFEN 600 MG: 600 TABLET ORAL at 01:05

## 2019-05-01 RX ADMIN — IBUPROFEN 600 MG: 600 TABLET ORAL at 12:05

## 2019-05-01 RX ADMIN — Medication 150 MG: at 09:05

## 2019-05-01 RX ADMIN — HYDROCODONE BITARTRATE AND ACETAMINOPHEN 1 TABLET: 10; 325 TABLET ORAL at 04:05

## 2019-05-01 RX ADMIN — DOCUSATE SODIUM 100 MG: 100 CAPSULE, LIQUID FILLED ORAL at 09:05

## 2019-05-01 RX ADMIN — HYDROCODONE BITARTRATE AND ACETAMINOPHEN 1 TABLET: 10; 325 TABLET ORAL at 10:05

## 2019-05-01 RX ADMIN — IBUPROFEN 600 MG: 600 TABLET ORAL at 06:05

## 2019-05-01 NOTE — PLAN OF CARE
Problem: Adult Inpatient Plan of Care  Goal: Plan of Care Review  Outcome: Outcome(s) achieved Date Met: 05/01/19  Pt ambulating, voiding, and passing flatus. Pt tolerating PO well and no SS of distress at this time. Pt's pain well controlled throughout shift by oral pain medication. Pt's bleeding has been light throughout shift and fundus is firm. Mother/baby care guide reviewed with pt using a . All questions answered. Discharge instructions reviewed using ; all questions answered. [facility] transport wheeling pt off of unit with baby in arms.

## 2019-05-01 NOTE — MEDICAL/APP STUDENT
Delivery Discharge Summary  Obstetrics      Primary OB Clinician: Abby Brush MD    Admission date: 2019  Discharge date: 2019    Disposition: To home, self care    Discharge Diagnosis List:      Patient Active Problem List   Diagnosis    Maternal care for scar from previous  delivery    Pregnancy with poor reproductive history, antepartum    Diet controlled gestational diabetes mellitus (GDM), antepartum    Encounter for sterilization    39 weeks gestation of pregnancy     delivery, delivered, current hospitalization, with BTL     delivery delivered    Acute posthemorrhagic anemia       Procedure: , due to GDM with poor compliance (MFM recommended)    Hospital Course:  Richard Gonzalez is a 33 y.o. now , PPD #*** who was admitted on 2019 at 39w2d for . Patient was subsequently admitted to labor and delivery unit with signed consents.     Delivery via  was performed without complications.    Please see delivery note for further details. Her postpartum course was complicated by acute blood loss anemia (H/H 12/36 --> 9/28.8), which was tx with iron. On discharge day, patient's pain is controlled with oral pain medications. Pt is tolerating ambulation without SOB or CP, and regular diet without N/V. Reports lochia is ***mild. Denies any HA, vision changes, F/C, LE swelling. Denies any breast pain/soreness.    Pt in stable condition and ready for discharge. She has been instructed to start and/or continue medications and follow up with her obstetrics provider as listed below.    Pertinent studies:  CBC  Recent Labs   Lab 19  1820 19  0420   WBC 7.32 10.17   HGB 11.2* 9.0*   HCT 34.8* 28.8*   MCV 85 86    162        ***  Immunization History   Administered Date(s) Administered    Influenza - Quadrivalent - PF 01/15/2019    Tdap 01/15/2019        Delivery:    Episiotomy: None   Lacerations: None   Repair suture:     Repair # of  packets:     Blood loss (ml): 0     Birth information:  YOB: 2021   Time of birth: 9:01 PM   Sex: female   Delivery type: , Low Transverse   Gestational Age: 39w2d    Delivery Clinician:      Other providers:       Additional  information:  Forceps:    Vacuum:    Breech:    Observed anomalies      Living?:           APGARS  One minute Five minutes Ten minutes   Skin color:         Heart rate:         Grimace:         Muscle tone:         Breathing:         Totals: 8  9        Placenta: Delivered:       appearance      Patient Instructions:   Current Discharge Medication List      START taking these medications    Details   HYDROcodone-acetaminophen (NORCO) 5-325 mg per tablet Take 1 tablet by mouth every 4 (four) hours as needed.  Qty: 20 tablet, Refills: 0      ibuprofen (ADVIL,MOTRIN) 600 MG tablet Take 1 tablet (600 mg total) by mouth every 6 (six) hours.  Qty: 30 tablet, Refills: 1         CONTINUE these medications which have NOT CHANGED    Details   prenat.vits,niurka,min-iron-folic (PRENATAL VITAMIN) Tab Take 1 capsule by mouth once daily.  Qty: 30 each, Refills: 0         STOP taking these medications       benzonatate (TESSALON PERLES) 100 MG capsule Comments:   Reason for Stopping:         blood sugar diagnostic (CONTOUR TEST STRIPS) Strp Comments:   Reason for Stopping:         blood sugar diagnostic (CONTOUR TEST STRIPS) Strp Comments:   Reason for Stopping:         blood-glucose meter Misc Comments:   Reason for Stopping:         lancets (ACCU-CHEK SOFTCLIX LANCETS) Misc Comments:   Reason for Stopping:               No discharge procedures on file.         ***

## 2019-05-01 NOTE — PLAN OF CARE
Problem: Breastfeeding  Goal: Effective Breastfeeding  Based on pt's stated feeding goals, the Plan of care for today is for pt to do skin-to-skin, to feed frequently on demand, to observe for signs of effective milk transfer, to monitor voids and stools. Pt may breastfeed and/ or express breastmilk with manual pump, and provide EBM to baby with bottle per preference and then to supplement with formula per preference. Pt verbalizes understanding.

## 2019-05-01 NOTE — PROGRESS NOTES
POSTPARTUM PROGRESS NOTE     Richard Gonzalez is a 33 y.o. female POD #2 status post RLTCS and BTL at 39w2d in a pregnancy complicated by poorly controlled GDM and obesity.   She is doing well this morning, and reports moderate abdominal pain that is relieved by oral pain medications. Vaginal bleeding is mild and stable. She is voiding without difficulty and is ambulating. She has not passed flatus. She denies nausea, vomiting, fever or chills. Patient does not plan to breast feed. She is now s/p BTL for contraception. She reports female infant in room with her is doing well also.    Language line used to communicate with patient as she is Romansh speaking only.     Objective:      Temp:  [98.7 °F (37.1 °C)-99 °F (37.2 °C)] 98.8 °F (37.1 °C)  Pulse:  [69-87] 76  Resp:  [17-18] 17  SpO2:  [95 %-97 %] 96 %  BP: (128-144)/(68-78) 135/72     General:   Awake, alert, appears her stated age, no apparent distress   Lungs:   No evidence of respiratory distress. Quiet, unlabored breathing   Heart:   Regular rate and rhythm   Abdomen:  Appears nondistended. Soft. Nontender to palpation. Uterine fundus palpable at the umbilicus. Pfannensteil incision with clean, dry bandage in place, with mild/appropriate elizabeth-incisional tenderness   Extremities: Teds/SCDs in place. No LE edema.      Lab Review  Lab Results   Component Value Date    WBC 10.17 2019    HGB 9.0 (L) 2019    HCT 28.8 (L) 2019    MCV 86 2019     2019      Fasting Blood Glucose: 90    I/O    Intake/Output Summary (Last 24 hours) at 2019 0851  Last data filed at 2019 1100  Gross per 24 hour   Intake --   Output 1100 ml   Net -1100 ml        Assessment:     Richard Gonzalez is a 33 y.o.  now POD#1 s/p RLTCS and BTL.     Patient Active Problem List   Diagnosis    Maternal care for scar from previous  delivery    Pregnancy with poor reproductive history, antepartum    Diet controlled gestational diabetes  mellitus (GDM), antepartum    Encounter for sterilization     delivery, delivered, current hospitalization, with BTL     delivery delivered    Acute posthemorrhagic anemia     Plan:     1. Routine postpartum care:  - VS WNL and stable since delivery. Continue to monitor every 4 hours  - Pre-delivery H/H , now .8 postpartum  - Lochia mild and stable - continue to monitor while inpatient  - Pain control achieved with PO medication - continue scheduled ibuprofen and PRN Norco 5 or 10mg  - Regular diet  - Encourage ambulation  - Lactation counseling following for bottle-feeding support  - Contraception - s/p BTL    2. Anemia  - Mild, with PP H/H .8  - Asymptomatic  - VSS and WNL  - Iron and colace prescribed     3. Gestational DM  - Managed with diet, however not well-controlled  - FBG 90 this morning  - 2-hour GTT postpartum    Dispo: As patient meets milestones, will plan to discharge on POD#3-4. Desires discharge today.     Swapna Granados MD  OB/GYN  PGY-1

## 2019-05-01 NOTE — LACTATION NOTE
"Lactation per room per request of discharge RN as pt had stated she has questions re: breastfeeding. Pt reports she would like to exclusively breastfeeding but "there isn't enough milk, with my first children I had lots of milk at the beginning." Discussed  stomach size, supply and demand and milk production. Pt reports she has been bringing infant to breast prior to offering formula "but she won't latch on and then she is screaming for food." Encouraged hand expression and LC offered to demonstrate for pt but she declined at this time. Infant sleeping at this time, parents report she last ate at 0930. LC number left on board and encouraged to call with next hunger cues, verbalized understanding.     Family provided with [place] information and [facility] information to obtain breast pump per pt request.   "

## 2019-05-01 NOTE — LACTATION NOTE
Lactation discharge education completed. Plan of care is for pt to follow basic breastfeeding education, frequent feeding on demand, and to monitor baby's voids and stools. Breastfeeding guide, including First Alert survey, resource list, and lactation warmline phone number reviewed. Pt has been providing 15-30mL formula per bottle per preference, states goal is to exclusively breastfeed. Provided with manual pump and instructed on use, declines to pump at this time. Encouraged bringing infant to breast and attempting latch, if unable to latch or pt feels infant not feeding well, to pump for 15-20 minutes with manual pump and provide pumped milk to infant, pt to supplement with formula to infant contentedness. Pt to notify doctor for maternal or infant concerns, as reviewed with LC. Pt and family member verbalize understanding and questions answered.

## 2019-05-01 NOTE — DISCHARGE INSTRUCTIONS
Instrucciones de la lactancia maternal para los bebes recién nacidos:    La [employer] recomienda la leche maternal aramis la unica Joshua de nutrición para timmons bebé nereida los primeros 6 meses de la leila, y puede continuar, con la introducción de comida, hasta y despues de 1 ano de edad. Informado sobre serrato consejos: Hay muchos beneficios de amamantar para el zuleika de la madre y del bebé. Altagracia los chupones, teteras, o botellas por lo menos por las primeras 4 semanas. Usar los chupones, teteras, o botellas pueden interumpir el proceso de amamantar.    Alimenta en cuanto hay muestras de hambre:  o Chente en la boca, hacienda movimientos de succionar.  o Ruiditos suavecitos o estirarse  o Llorar es un signo de hambre tardío: no esperes a que el bebé llore.   Es de esperarse que haya 8-12 alimentaciones por 24 horas, aunque estas alimentaciones no sigan un horario definido.   Alterna el seno con el que empiezas, o empieza con el seno que se siente más lleno.   Cambia de lado cuando el bebé empiece a tragar más despacio o se desconecte del seno.   Esta bayron si el bebé no come del teresita seno en cada alimentación.   Si el bebé esta muy dormido o somnoliente: el contacto de piel a piel puede animarlo a empezar a comer:  o Quítale la camiseta y acuéstalo sobre tu pecho desnudo   Duerme cerca de tu bebé, aun en la casa. Aprende a omar pecho acostada.   En la posición correcta los dos estarán cómodos:  o barbilla con seno, pecho con pecho  o Labio del bebé abierto hacia afuera para tragar: el labio del bebé debe estar volteado hacia afuera  o Boca abierta bayron luis: la boca del bebé cubre la mayoría de la areola (el área oscura del seno)--no nada más el pezón   Fíjate en los signos de que hay transferencia de leche:  o Puedes oír al bebé tragar.  o No se oyen ruidos más o menos bob aramis clic.  o El bebé no demuestra que tiene más hambre después de la alimentación.  o El cuerpo del bebé  y ivis jimmy están relajados por un tiempo corto.   Lo que entra, tiene que salir. Está pendiente de:  o Al cuarto día, por lo menos 3 cacas al día.  o La julisa cambia de maddy a kellen o café y luego a amarillo más líquido cuando baja tu leche.  o Después del cuarto día, por lo menos 6 paòales mojados/pesados al día.  o La orina debe ser de color amarillo bakari cuando baja tu leche.   Debes david agua cuando tienes sed y comer alimentos sanos cuando tiene hambre. Ivana siesta para descansar lo suficiente.    No tomes medicamentos o alcohol sino con el consejo de timmons doctor. Puedes llamar al [facility] ([phone]), Lunes a Viernes, 8am-5pm, para obtener información sobre los medicamentos y la leche maternal.  La ashlie de seguimiento con la pediatra debe ser 2 días después de salir del hospital. El bebé deberia lexi ganado el peso de nacimiento cuando tiene 10-14 días

## 2019-05-01 NOTE — DISCHARGE SUMMARY
Delivery Discharge Summary  Obstetrics      Primary OB Clinician: Abby Brush MD    Admission date: 2019  Discharge date: 2019    Disposition: To home, self care    Discharge Diagnosis List:      Patient Active Problem List   Diagnosis    Maternal care for scar from previous  delivery    Pregnancy with poor reproductive history, antepartum    Diet controlled gestational diabetes mellitus (GDM), antepartum    Encounter for sterilization     delivery, delivered, current hospitalization, with [facility]     delivery delivered    Acute posthemorrhagic anemia       Procedure: , due to GDM with poor compliance (MFM recommended)    Hospital Course:  Richard Gonzalez is a 33 y.o. now , POD #2 from [facility] who was admitted on 2019 at 39w2d for c/s due to uncontrolled GDM. Patient was subsequently admitted to labor and delivery unit with signed consents.     Delivery via  was performed without complications.    Please see delivery note for further details. Her postpartum course was complicated by acute blood loss anemia (H/H 12/36 --> 9/28.8), which was tx with iron. On discharge day, patient's pain is controlled with oral pain medications. Pt is tolerating ambulation without SOB or CP, and regular diet without N/V. Reports lochia is mild. Denies any HA, vision changes, F/C, LE swelling. Denies any breast pain/soreness. Fasting glucose 90.    Pt in stable condition and ready for discharge. She has been instructed to start and/or continue medications and follow up with her obstetrics provider as listed below.     Few mild range BPs that self resolved during hosptial course. No si/sy of pre-e. Will have follow up in one [facility] for BP check.     Pertinent studies:  CBC  Recent Labs   Lab 19  1820 19  0420   WBC 7.32 10.17   HGB 11.2* 9.0*   HCT 34.8* 28.8*   MCV 85 86    162          Immunization History   Administered Date(s) Administered     Influenza - Quadrivalent - PF 01/15/2019    Tdap 01/15/2019        Delivery:    Episiotomy: None   Lacerations: None   Repair suture:     Repair # of packets:     Blood loss (ml): 0     Birth information:  YOB: 2021   Time of birth: 9:01 PM   Sex: female   Delivery type: , Low Transverse   Gestational Age: 39w2d    Delivery Clinician:      Other providers:       Additional  information:  Forceps:    Vacuum:    Breech:    Observed anomalies      Living?:           APGARS  One minute Five minutes Ten minutes   Skin color:         Heart rate:         Grimace:         Muscle tone:         Breathing:         Totals: 8  9        Placenta: Delivered:       appearance      Patient Instructions:   Current Discharge Medication List      START taking these medications    Details   HYDROcodone-acetaminophen (NORCO) 5-325 mg per tablet Take 1 tablet by mouth every 4 (four) hours as needed.  Qty: 20 tablet, Refills: 0      ibuprofen (ADVIL,MOTRIN) 600 MG tablet Take 1 tablet (600 mg total) by mouth every 6 (six) hours.  Qty: 30 tablet, Refills: 1         CONTINUE these medications which have NOT CHANGED    Details   prenat.vits,niurka,min-iron-folic (PRENATAL VITAMIN) Tab Take 1 capsule by mouth once daily.  Qty: 30 each, Refills: 0         STOP taking these medications       benzonatate (TESSALON PERLES) 100 MG capsule Comments:   Reason for Stopping:         blood sugar diagnostic (CONTOUR TEST STRIPS) Strp Comments:   Reason for Stopping:         blood sugar diagnostic (CONTOUR TEST STRIPS) Strp Comments:   Reason for Stopping:         blood-glucose meter Misc Comments:   Reason for Stopping:         lancets (ACCU-CHEK SOFTCLIX LANCETS) Misc Comments:   Reason for Stopping:               Discharge Procedure Orders   Other restrictions (specify):   Order Comments: Pelvic rest until cleared by MD. Nothing in vagina till cleared by MD including tampons, douching, intercourse.     No driving until:   Order  Comments: Not taking narcotic medication     Notify your health care provider if you experience any of the following:  temperature >100.4     Notify your health care provider if you experience any of the following:  persistent nausea and vomiting or diarrhea     Notify your health care provider if you experience any of the following:  severe uncontrolled pain     Notify your health care provider if you experience any of the following:  redness, tenderness, or signs of infection (pain, swelling, redness, odor or green/yellow discharge around incision site)     Notify your health care provider if you experience any of the following:  difficulty breathing or increased cough     Notify your health care provider if you experience any of the following:  severe persistent headache     Notify your health care provider if you experience any of the following:  worsening rash     Notify your health care provider if you experience any of the following:  persistent dizziness, light-headedness, or visual disturbances     Notify your health care provider if you experience any of the following:  increased confusion or weakness     Notify your health care provider if you experience any of the following:   Order Comments: Bleeding through 2 pad/hr for 2 hours     Activity as tolerated       Follow-up Information     Abby Brush MD. Schedule an appointment as soon as possible for a visit in 4 weeks.    Specialty:  Obstetrics and Gynecology  Why:  post partum visit, post op visit  Contact information:  [address] [facility] [address] [phone]             [facility] - OB/ GYN In 1 week.    Specialty:  Obstetrics and Gynecology  Why:  BP CHECK  Contact information:  [address] [facility] [address] [phone]                  Alessandra Titus M.D.  OBGYN  PGY2

## 2019-05-08 ENCOUNTER — OFFICE VISIT (OUTPATIENT)
Dept: OBSTETRICS AND GYNECOLOGY | Facility: CLINIC | Age: 34
End: 2019-05-08
Payer: MEDICAID

## 2019-05-08 VITALS
WEIGHT: 146.63 LBS | HEIGHT: 60 IN | DIASTOLIC BLOOD PRESSURE: 82 MMHG | SYSTOLIC BLOOD PRESSURE: 130 MMHG | BODY MASS INDEX: 28.79 KG/M2

## 2019-05-08 DIAGNOSIS — Z01.30 BP CHECK: Primary | ICD-10-CM

## 2019-05-08 PROCEDURE — 99212 PR OFFICE/OUTPT VISIT, EST, LEVL II, 10-19 MIN: ICD-10-PCS | Mod: S$PBB,,, | Performed by: OBSTETRICS & GYNECOLOGY

## 2019-05-08 PROCEDURE — 99999 PR PBB SHADOW E&M-EST. PATIENT-LVL II: CPT | Mod: PBBFAC,,,

## 2019-05-08 PROCEDURE — 99212 OFFICE O/P EST SF 10 MIN: CPT | Mod: PBBFAC

## 2019-05-08 PROCEDURE — 99999 PR PBB SHADOW E&M-EST. PATIENT-LVL II: ICD-10-PCS | Mod: PBBFAC,,,

## 2019-05-08 PROCEDURE — 99212 OFFICE O/P EST SF 10 MIN: CPT | Mod: S$PBB,,, | Performed by: OBSTETRICS & GYNECOLOGY

## 2019-05-08 NOTE — PROGRESS NOTES
CC: BP check    History of Present Illness: Richard Gonzalez is a 33 y.o. female that presents today 2019 for   [facility]. [facility] spanish on line for visit, Marisela. Pt delivered via  on 19 @ 39w2d. Pt was complicated by GDM and some mild range BPs during hospital stay. She was told to have a 1 week BP check. Pt denies any HA, blurry vision, epigastric pain, SOB, chest pain, swelling has subsided. BP today was 132/80. No other complaints or concerns noted.       Past Medical History:   Diagnosis Date    Gestational diabetes     Uses Nepali as primary spoken language        Past Surgical History:   Procedure Laterality Date     SECTION      x2     SECTION, WITH TUBAL LIGATION N/A 2019    Performed by Mercy Nguyen MD at [facility] L&D       Current Outpatient Medications   Medication Sig Dispense Refill    prenat.vits,niurka,min-iron-folic (PRENATAL VITAMIN) Tab Take 1 capsule by mouth once daily. 30 each 0     No current facility-administered medications for this visit.        Review of patient's allergies indicates:   Allergen Reactions    Lactose Hives       Family History   Problem Relation Age of Onset    Breast cancer Neg Hx     Colon cancer Neg Hx     Ovarian cancer Neg Hx        Social History     Tobacco Use    Smoking status: Never Smoker    Smokeless tobacco: Never Used   Substance Use Topics    Alcohol use: No     Frequency: Never    Drug use: No       OB History    Para Term  AB Living   3 3 1     3   SAB TAB Ectopic Multiple Live Births         0 3      # Outcome Date GA Lbr Adam/2nd Weight Sex Delivery Anes PTL Lv   3 Term 19 39w2d  3.42 kg (7 lb 8.6 oz) F CS-LTranv Spinal N LYNDA   2 Para            1 Para                Review of Symptoms:  GENERAL: Denies weight gain or weight loss. Feeling well overall.   SKIN: Denies rash or lesions.   HEAD: Denies head injury or headache.    NODES: Denies enlarged lymph nodes.   CHEST: Denies chest pain or shortness of breath.   CARDIOVASCULAR: Denies palpitations or left sided chest pain.   ABDOMEN: No abdominal pain, constipation, diarrhea, nausea, vomiting or rectal bleeding.   URINARY: No frequency, dysuria, hematuria, or burning on urination.  HEMATOLOGIC: No easy bruisability or excessive bleeding.   MUSCULOSKELETAL: Denies joint pain or swelling.     /82   Ht 5' (1.524 m)   Wt 66.5 kg (146 lb 9.7 oz)   LMP 07/26/2018   Physical Exam:  APPEARANCE: Well nourished, well developed, in no acute distress.  SKIN: Normal skin turgor, no lesions.  RESPIRATORY: Normal respiratory effort with no retractions or use of accessory muscles  ABDOMEN: Soft. No tenderness or masses. No hepatosplenomegaly. No hernias.  PELVIC: deferred  EXTREMITIES: + 1 bilateral feet/ankle edema.    ASSESSMENT/PLAN:  BP check        -Reviewed preE signs and symptoms with pt via  and when to call or go to [facility]. Pt verbalized understanding.     Follow-up:  RTC for 6 week PP visit with primary OB

## 2019-05-15 ENCOUNTER — POSTPARTUM VISIT (OUTPATIENT)
Dept: OBSTETRICS AND GYNECOLOGY | Facility: CLINIC | Age: 34
End: 2019-05-15
Payer: MEDICAID

## 2019-05-15 VITALS
SYSTOLIC BLOOD PRESSURE: 122 MMHG | WEIGHT: 142.88 LBS | DIASTOLIC BLOOD PRESSURE: 74 MMHG | HEIGHT: 60 IN | BODY MASS INDEX: 28.05 KG/M2

## 2019-05-15 DIAGNOSIS — M54.2 NECK PAIN: Primary | ICD-10-CM

## 2019-05-15 PROCEDURE — 99999 PR PBB SHADOW E&M-EST. PATIENT-LVL III: CPT | Mod: PBBFAC,,, | Performed by: OBSTETRICS & GYNECOLOGY

## 2019-05-15 PROCEDURE — 99213 OFFICE O/P EST LOW 20 MIN: CPT | Mod: PBBFAC,PO | Performed by: OBSTETRICS & GYNECOLOGY

## 2019-05-15 PROCEDURE — 99999 PR PBB SHADOW E&M-EST. PATIENT-LVL III: ICD-10-PCS | Mod: PBBFAC,,, | Performed by: OBSTETRICS & GYNECOLOGY

## 2019-05-15 RX ORDER — IBUPROFEN 800 MG/1
800 TABLET ORAL EVERY 8 HOURS PRN
Qty: 60 TABLET | Refills: 2 | Status: SHIPPED | OUTPATIENT
Start: 2019-05-15 | End: 2019-06-11

## 2019-05-15 NOTE — PROGRESS NOTES
History & Physical  Gynecology      SUBJECTIVE:     Chief Complaint: Postpartum Care and Back Pain (Start at mid section and radiates up to the neck )       History of Present Illness:  Richard Gonzalez is a 33 y.o. here today for postpartum check. She is 2 weeks postpartum s/p RLTCS + BTL. She is complaining of neck stiffness / pain and occasional headache when her neck pain radiates up. Denies any visual changes or any other neurologic symptoms.   Tolerating regular diet without issues. Normal bowel/bladder function. Infant breast feeding. No incisional issues.       Review of patient's allergies indicates:   Allergen Reactions    Lactose Hives       Past Medical History:   Diagnosis Date    Gestational diabetes     Uses Mohawk as primary spoken language      Past Surgical History:   Procedure Laterality Date     SECTION      x2     SECTION, WITH TUBAL LIGATION N/A 2019    Performed by Mercy Nguyen MD at [facility] [place], operated by [facility] L&D     OB History        3    Para   3    Term   1            AB        Living   3       SAB        TAB        Ectopic        Multiple   0    Live Births   3               Family History   Problem Relation Age of Onset    Breast cancer Neg Hx     Colon cancer Neg Hx     Ovarian cancer Neg Hx      Social History     Tobacco Use    Smoking status: Never Smoker    Smokeless tobacco: Never Used   Substance Use Topics    Alcohol use: No     Frequency: Never    Drug use: No       Current Outpatient Medications   Medication Sig    ibuprofen (ADVIL,MOTRIN) 800 MG tablet Take 1 tablet (800 mg total) by mouth every 8 (eight) hours as needed for Pain.    prenat.vits,niurka,min-iron-folic (PRENATAL VITAMIN) Tab Take 1 capsule by mouth once daily.     No current facility-administered medications for this visit.          Review of Systems:  Review of Systems   Constitutional: Negative for chills and fever.   Respiratory: Negative for shortness of breath.     Cardiovascular: Negative for chest pain.   Gastrointestinal: Negative for abdominal pain, nausea and vomiting.   Genitourinary: Negative for dysuria, flank pain and hematuria.   Musculoskeletal:        +neck pain   Neurological: Positive for headaches. Negative for seizures, syncope and numbness.        OBJECTIVE:     Physical Exam:   BP: (122)/(74)    Physical Exam   Constitutional: She is oriented to person, place, and time. She appears well-developed and well-nourished.   Cardiovascular: Normal rate.   Pulmonary/Chest: Effort normal. No respiratory distress.   Abdominal: Soft. She exhibits no distension and no mass. There is no tenderness. There is no guarding.   Incision c/d/i   Neurological: She is alert and oriented to person, place, and time. She has normal strength. No cranial nerve deficit or sensory deficit. Coordination and gait normal.   Psychiatric: She has a normal mood and affect. Her behavior is normal. Judgment and thought content normal.         ASSESSMENT:       ICD-10-CM ICD-9-CM    1. Neck pain M54.2 723.1 ibuprofen (ADVIL,MOTRIN) 800 MG tablet          Plan:      Suspect musculoskeletal neck pain   Neuro exam normal  Recommend Ibuprofen 800 Q 8 PRN   Recommend alternating heat and ice packs   RTC 4 weeks for postpartum exam      Abby Brush

## 2019-06-11 ENCOUNTER — HOSPITAL ENCOUNTER (EMERGENCY)
Facility: HOSPITAL | Age: 34
Discharge: HOME | End: 2019-06-11
Attending: EMERGENCY MEDICINE
Payer: MEDICAID

## 2019-06-11 VITALS
BODY MASS INDEX: 27.88 KG/M2 | HEIGHT: 60 IN | OXYGEN SATURATION: 99 % | RESPIRATION RATE: 14 BRPM | HEART RATE: 67 BPM | DIASTOLIC BLOOD PRESSURE: 61 MMHG | TEMPERATURE: 98 F | SYSTOLIC BLOOD PRESSURE: 114 MMHG | WEIGHT: 142 LBS

## 2019-06-11 DIAGNOSIS — S63.642A SPRAIN OF METACARPOPHALANGEAL (MCP) JOINT OF LEFT THUMB, INITIAL ENCOUNTER: Primary | ICD-10-CM

## 2019-06-11 PROCEDURE — 99284 PR EMERGENCY DEPT VISIT,LEVEL IV: ICD-10-PCS | Mod: ,,, | Performed by: NURSE PRACTITIONER

## 2019-06-11 PROCEDURE — 99283 EMERGENCY DEPT VISIT LOW MDM: CPT | Mod: 25

## 2019-06-11 PROCEDURE — 29130 APPL FINGER SPLINT STATIC: CPT | Mod: FA

## 2019-06-11 PROCEDURE — 99284 EMERGENCY DEPT VISIT MOD MDM: CPT | Mod: ,,, | Performed by: NURSE PRACTITIONER

## 2019-06-11 RX ORDER — IBUPROFEN 800 MG/1
800 TABLET ORAL EVERY 6 HOURS PRN
Qty: 20 TABLET | Refills: 0 | Status: SHIPPED | OUTPATIENT
Start: 2019-06-11 | End: 2020-05-27

## 2019-06-11 RX ORDER — GUAIFENESIN 1200 MG
TABLET, EXTENDED RELEASE 12 HR ORAL DAILY PRN
COMMUNITY
End: 2019-06-13

## 2019-06-11 NOTE — ED NOTES
Through , pt twisted left hand when pushing herself off of the bed.  Pt states that she bend her thumb backwards.

## 2019-06-11 NOTE — ED NOTES
LOC: The patient is awake, alert and aware of environment with an appropriate affect, the patient is oriented x 3 and speaking appropriately.  APPEARANCE: Patient resting comfortably and in no acute distress, patient is clean and well groomed, patient's clothing is properly fastened.  SKIN: The skin is warm and dry, color consistent with ethnicity, patient has normal skin turgor and moist mucus membranes, skin intact, no breakdown or bruising noted.  MUSCULOSKELETAL: Patient moving all extremities spontaneously, no obvious swelling or deformities noted.  Decreased ROM to left hand due to pain on movement  RESPIRATORY: Airway is open and patent, respirations are spontaneous, patient has a normal effort and rate, no accessory muscle use noted.

## 2019-06-12 NOTE — ED PROVIDER NOTES
Encounter Date: 2019       History     Chief Complaint   Patient presents with    Hand Pain     states jammed hand about 2 weeks ago. C/o pain to left hand. Translated via family member with patient.      33-year-old female with presents the emergency room after she twisted her thumb while in bed 2 days ago.  Patient states that she recently had a baby and she was in the bed moving around when her thumb twisted the wrong way.  Patient denies any numbness or tingling.  She has not taken anything to help alleviate the pain.  Patient states that she is not able to bend or extend her thumb completely.    The history is provided by the patient. The history is limited by a language barrier. A  was used.     Review of patient's allergies indicates:   Allergen Reactions    Lactose Hives     Past Medical History:   Diagnosis Date    Gestational diabetes     Uses Czech as primary spoken language      Past Surgical History:   Procedure Laterality Date     SECTION      x2     SECTION, WITH TUBAL LIGATION N/A 2019    Performed by Mercy Nguyen MD at [facility] L[facility]D     Family History   Problem Relation Age of Onset    Breast cancer Neg Hx     Colon cancer Neg Hx     Ovarian cancer Neg Hx      Social History     Tobacco Use    Smoking status: Never Smoker    Smokeless tobacco: Never Used   Substance Use Topics    Alcohol use: No     Frequency: Never    Drug use: No     Review of Systems   Constitutional: Negative for fever.   HENT: Negative for sore throat.    Respiratory: Negative for shortness of breath.    Cardiovascular: Negative for chest pain.   Gastrointestinal: Negative for nausea.   Genitourinary: Negative for dysuria.   Musculoskeletal: Positive for arthralgias and myalgias. Negative for back pain.   Skin: Negative for rash.   Neurological: Negative for weakness.   Hematological: Does not bruise/bleed easily.   All other systems reviewed and are  negative.    Physical Exam     Initial Vitals [06/11/19 1800]   BP Pulse Resp Temp SpO2   110/60 69 14 98.2 °F (36.8 °C) 99 %      MAP       --         Physical Exam    Nursing note and vitals reviewed.  Constitutional: She appears well-developed and well-nourished.   HENT:   Head: Normocephalic and atraumatic.   Eyes: Conjunctivae and EOM are normal. Pupils are equal, round, and reactive to light.   Neck: Normal range of motion.   Cardiovascular: Normal rate, regular rhythm, normal heart sounds and intact distal pulses. Exam reveals no gallop and no friction rub.    No murmur heard.  Pulmonary/Chest: Breath sounds normal. No respiratory distress. She has no wheezes. She has no rhonchi. She has no rales. She exhibits no tenderness.   Abdominal: Soft. Bowel sounds are normal.   Musculoskeletal: She exhibits edema and tenderness.        Left hand: She exhibits decreased range of motion, tenderness and swelling. She exhibits no bony tenderness, normal two-point discrimination, normal capillary refill, no deformity and no laceration. Normal sensation noted. Normal strength noted.        Hands:  Patient is able to make a fist and extend fingers without difficulty; extension of some is limited secondary to swelling and pain   Neurological: She is alert and oriented to person, place, and time. She has normal strength. No cranial nerve deficit or sensory deficit. GCS score is 15. GCS eye subscore is 4. GCS verbal subscore is 5. GCS motor subscore is 6.   Skin: Skin is warm. No abscess noted. No erythema.       ED Course   Procedures  Labs Reviewed - No data to display       Imaging Results          X-Ray Hand 3 view Left (Final result)  Result time 06/11/19 19:17:03    Final result by Jaya Huertas MD (06/11/19 19:17:03)                 Impression:      1. No acute displaced fracture, dislocation, retained radiopaque foreign body or suspicious osseous lesion appreciated.      Electronically signed by: Jaya  Aleksandar  Date:    06/11/2019  Time:    19:17             Narrative:    EXAMINATION:  XR HAND COMPLETE 3 VIEW LEFT    CLINICAL HISTORY:  hyperextension of thumb;.    TECHNIQUE:  3 views of the left hand    COMPARISON:  None    FINDINGS:  No acute displaced fracture, dislocation, retained radiopaque foreign body or suspicious osseous lesion appreciated.  Anatomic alignment is maintained.    Regional soft tissues are grossly unremarkable.                                 Medical Decision Making:   Initial Assessment:   33-year-old female with presents the emergency room after she twisted her thumb while in bed 2 days ago.  Patient states that she recently had a baby and she was in the bed moving around when her thumb twisted the wrong way.  Patient denies any numbness or tingling.  She has not taken anything to help alleviate the pain.  Patient states that she is not able to bend or extend her thumb completely.  Differential Diagnosis:   Thumb sprain, thumb fracture, hand fracture, tenosynovitis  Clinical Tests:   Radiological Study: Ordered and Reviewed  ED Management:  Patient examined and has minimal edema noted to the base of the left thumb consistent with an hyperextension injury. X-ray negative for fracture.  Patient given Motrin and a thumb splint at discharge. Patient advised that if she continues to have pain in a week or 2 to follow up with orthopedist.  A  was used for all communication with the patient.  Patient given strict return precautions and voiced understanding of all discharge instructions.  Patient stable at discharge.              Attending Attestation:     Physician Attestation Statement for NP/PA:   I discussed this assessment and plan of this patient with the NP/PA, but I did not personally examine the patient. The face to face encounter was performed by the NP/PA.                  ED Course as of Jun 11 1949 Tue Jun 11, 2019   1802 BP: 110/60 [AT]   1802 Temp: 98.2 °F (36.8 °C) [AT]    1802 Temp src: Oral [AT]   1802 Pulse: 69 [AT]   1802 Resp: 14 [AT]   1802 SpO2: 99 % [AT]      ED Course User Index  [AT] JULIANN Lopes     Clinical Impression:       ICD-10-CM ICD-9-CM   1. Sprain of metacarpophalangeal (MCP) joint of left thumb, initial encounter S63.642A 842.12                                JULIANN Lopes  06/11/19 1950

## 2019-06-13 ENCOUNTER — POSTPARTUM VISIT (OUTPATIENT)
Dept: OBSTETRICS AND GYNECOLOGY | Facility: CLINIC | Age: 34
End: 2019-06-13
Payer: MEDICAID

## 2019-06-13 VITALS
HEIGHT: 60 IN | DIASTOLIC BLOOD PRESSURE: 74 MMHG | WEIGHT: 148.38 LBS | BODY MASS INDEX: 29.13 KG/M2 | SYSTOLIC BLOOD PRESSURE: 112 MMHG

## 2019-06-13 DIAGNOSIS — Z86.32 HISTORY OF GESTATIONAL DIABETES: ICD-10-CM

## 2019-06-13 PROCEDURE — 59430 PR CARE AFTER DELIVERY ONLY: ICD-10-PCS | Mod: ,,, | Performed by: OBSTETRICS & GYNECOLOGY

## 2019-06-13 PROCEDURE — 99999 PR PBB SHADOW E&M-EST. PATIENT-LVL III: ICD-10-PCS | Mod: PBBFAC,,, | Performed by: OBSTETRICS & GYNECOLOGY

## 2019-06-13 PROCEDURE — 99213 OFFICE O/P EST LOW 20 MIN: CPT | Mod: PBBFAC,PO | Performed by: OBSTETRICS & GYNECOLOGY

## 2019-06-13 PROCEDURE — 99999 PR PBB SHADOW E&M-EST. PATIENT-LVL III: CPT | Mod: PBBFAC,,, | Performed by: OBSTETRICS & GYNECOLOGY

## 2019-06-13 NOTE — PROGRESS NOTES
Chief Complaint   Patient presents with    Postpartum Care     c/s 19       HPI:  33 y.o. female  presents for a post-partum check-up    Patient's last menstrual period was 2019 (exact date).    Delivery: REPEAT LTCS WITH BTL, 2019  Hospitalization: UNCOMPLICATED  Ambulating, tolerating Po, moving bowels: YES  Pain controlled: YES  Bleeding: STOPPED  Breastfeeding: YES  Breast pain or engorgement: DENIES  Postpartum depression: NO MOOD ISSUES.  NO THOUGHTS OF HURTING HERSELF OR HER BABY.  Incision: NO COMPLAINTS    Contraception: S/P BTL  Pap: 2018, NILM/HPV(-)    Past Medical History:   Diagnosis Date    Gestational diabetes     Uses Omani as primary spoken language      Past Surgical History:   Procedure Laterality Date     SECTION      x2     SECTION, WITH TUBAL LIGATION N/A 2019    Performed by Mercy Nguyen MD at [facility]       Social History     Tobacco Use    Smoking status: Never Smoker    Smokeless tobacco: Never Used   Substance Use Topics    Alcohol use: No     Frequency: Never     Family History   Problem Relation Age of Onset    Breast cancer Neg Hx     Colon cancer Neg Hx     Ovarian cancer Neg Hx      OB History    Para Term  AB Living   3 3 1     3   SAB TAB Ectopic Multiple Live Births         0 3      # Outcome Date GA Lbr Adam/2nd Weight Sex Delivery Anes PTL Lv   3 Term 19 39w2d  3.42 kg (7 lb 8.6 oz) F CS-LTranv Spinal N LYNDA   2 Para            1 Para                MEDICATIONS: Reviewed with patient.  ALLERGIES: Lactose     ROS:  Review of Systems   Constitutional: Negative for fever.   Respiratory: Negative for shortness of breath.    Cardiovascular: Negative for chest pain.   Gastrointestinal: Negative for abdominal pain, nausea and vomiting.   Genitourinary: Negative for menstrual problem, pelvic pain and vaginal bleeding.   Integumentary:  Negative for breast mass.   Neurological: Negative for headaches.    Psychiatric/Behavioral: Negative for depression.   Breast: Negative for mass and mastodynia      PHYSICAL EXAM:    /74   Ht 5' (1.524 m)   Wt 67.3 kg (148 lb 5.9 oz)   LMP 05/14/2019 (Exact Date)   Breastfeeding? No   BMI 28.98 kg/m²     Physical Exam:   Constitutional: She is oriented to person, place, and time. She appears well-developed.    HENT:   Head: Normocephalic.       Pulmonary/Chest: Effort normal.        Abdominal: She exhibits no distension and no mass. There is no hepatosplenomegaly. There is no tenderness. No hernia.   Incision: clean, dry, intact; no erythema, induration, or drainage                 Neurological: She is alert and oriented to person, place, and time.     Psychiatric: She has a normal mood and affect.         ASSESSMENT & PLAN:   Encounter for routine postpartum follow-up    History of gestational diabetes  -     Glucose Tolerance 2 Hour; Future; Expected date: 06/13/2019        - Doing well  - Exam: NORMAL  - Mood / depression screening: NO MOOD ISSUES  - Contraception: S/P BTL  - Lactation referral: NOT INDICATED    - SCHEDULE 2-HR GTT FOR HISTORY OF G-DM

## 2019-06-18 ENCOUNTER — APPOINTMENT (OUTPATIENT)
Dept: LAB | Facility: HOSPITAL | Age: 34
End: 2019-06-18
Attending: OBSTETRICS & GYNECOLOGY
Payer: MEDICAID

## 2019-06-18 LAB
GLUCOSE SERPL-MCNC: 161 MG/DL
GLUCOSE SERPL-MCNC: 186 MG/DL
GLUCOSE SERPL-MCNC: 93 MG/DL (ref 70–110)

## 2019-06-18 PROCEDURE — 82951 GLUCOSE TOLERANCE TEST (GTT): CPT

## 2019-07-09 ENCOUNTER — TELEPHONE (OUTPATIENT)
Dept: OBSTETRICS AND GYNECOLOGY | Facility: CLINIC | Age: 34
End: 2019-07-09

## 2019-07-09 NOTE — TELEPHONE ENCOUNTER
----- Message from HAYDE Sr MD sent at 7/9/2019  2:25 PM CDT -----  Please notify patient that her blood sugar test showed she may have borderline diabetes.  She needs to follow-up with a primary care physician.  Thanks.

## 2020-03-13 ENCOUNTER — HOSPITAL ENCOUNTER (EMERGENCY)
Facility: HOSPITAL | Age: 35
Discharge: HOME | End: 2020-03-14
Attending: EMERGENCY MEDICINE

## 2020-03-13 VITALS
DIASTOLIC BLOOD PRESSURE: 72 MMHG | SYSTOLIC BLOOD PRESSURE: 132 MMHG | OXYGEN SATURATION: 98 % | HEART RATE: 71 BPM | RESPIRATION RATE: 16 BRPM | TEMPERATURE: 98 F

## 2020-03-13 DIAGNOSIS — N30.00 ACUTE CYSTITIS WITHOUT HEMATURIA: Primary | ICD-10-CM

## 2020-03-13 PROCEDURE — 99284 EMERGENCY DEPT VISIT MOD MDM: CPT | Mod: 25

## 2020-03-13 PROCEDURE — 81001 URINALYSIS AUTO W/SCOPE: CPT

## 2020-03-13 PROCEDURE — 99284 EMERGENCY DEPT VISIT MOD MDM: CPT | Mod: ,,, | Performed by: EMERGENCY MEDICINE

## 2020-03-13 PROCEDURE — 81025 URINE PREGNANCY TEST: CPT | Performed by: EMERGENCY MEDICINE

## 2020-03-13 PROCEDURE — 99284 PR EMERGENCY DEPT VISIT,LEVEL IV: ICD-10-PCS | Mod: ,,, | Performed by: EMERGENCY MEDICINE

## 2020-03-14 VITALS
HEART RATE: 75 BPM | DIASTOLIC BLOOD PRESSURE: 75 MMHG | SYSTOLIC BLOOD PRESSURE: 108 MMHG | TEMPERATURE: 99 F | HEIGHT: 59 IN | WEIGHT: 140 LBS | RESPIRATION RATE: 18 BRPM | BODY MASS INDEX: 28.22 KG/M2 | OXYGEN SATURATION: 98 %

## 2020-03-14 DIAGNOSIS — R10.2 VAGINAL PAIN: Primary | ICD-10-CM

## 2020-03-14 LAB
ALBUMIN SERPL BCP-MCNC: 3.6 G/DL (ref 3.5–5.2)
ALP SERPL-CCNC: 119 U/L (ref 55–135)
ALT SERPL W/O P-5'-P-CCNC: 22 U/L (ref 10–44)
ANION GAP SERPL CALC-SCNC: 8 MMOL/L (ref 8–16)
AST SERPL-CCNC: 19 U/L (ref 10–40)
B-HCG UR QL: NEGATIVE
BACTERIA #/AREA URNS AUTO: ABNORMAL /HPF
BACTERIA GENITAL QL WET PREP: ABNORMAL
BASOPHILS # BLD AUTO: 0.04 K/UL (ref 0–0.2)
BASOPHILS NFR BLD: 0.4 % (ref 0–1.9)
BILIRUB SERPL-MCNC: 0.4 MG/DL (ref 0.1–1)
BILIRUB UR QL STRIP: NEGATIVE
BUN SERPL-MCNC: 8 MG/DL (ref 6–20)
CALCIUM SERPL-MCNC: 9 MG/DL (ref 8.7–10.5)
CHLORIDE SERPL-SCNC: 110 MMOL/L (ref 95–110)
CLARITY UR REFRACT.AUTO: ABNORMAL
CLUE CELLS VAG QL WET PREP: ABNORMAL
CO2 SERPL-SCNC: 22 MMOL/L (ref 23–29)
COLOR UR AUTO: YELLOW
CREAT SERPL-MCNC: 0.7 MG/DL (ref 0.5–1.4)
CTP QC/QA: YES
DIFFERENTIAL METHOD: ABNORMAL
EOSINOPHIL # BLD AUTO: 0.1 K/UL (ref 0–0.5)
EOSINOPHIL NFR BLD: 1.1 % (ref 0–8)
ERYTHROCYTE [DISTWIDTH] IN BLOOD BY AUTOMATED COUNT: 14.6 % (ref 11.5–14.5)
EST. GFR  (AFRICAN AMERICAN): >60 ML/MIN/1.73 M^2
EST. GFR  (NON AFRICAN AMERICAN): >60 ML/MIN/1.73 M^2
FILAMENT FUNGI VAG WET PREP-#/AREA: ABNORMAL
GLUCOSE SERPL-MCNC: 121 MG/DL (ref 70–110)
GLUCOSE UR QL STRIP: NEGATIVE
HCT VFR BLD AUTO: 40.2 % (ref 37–48.5)
HGB BLD-MCNC: 12.5 G/DL (ref 12–16)
HGB UR QL STRIP: NEGATIVE
IMM GRANULOCYTES # BLD AUTO: 0.03 K/UL (ref 0–0.04)
IMM GRANULOCYTES NFR BLD AUTO: 0.3 % (ref 0–0.5)
KETONES UR QL STRIP: NEGATIVE
LEUKOCYTE ESTERASE UR QL STRIP: NEGATIVE
LYMPHOCYTES # BLD AUTO: 3.7 K/UL (ref 1–4.8)
LYMPHOCYTES NFR BLD: 35.2 % (ref 18–48)
MCH RBC QN AUTO: 26.3 PG (ref 27–31)
MCHC RBC AUTO-ENTMCNC: 31.1 G/DL (ref 32–36)
MCV RBC AUTO: 85 FL (ref 82–98)
MICROSCOPIC COMMENT: ABNORMAL
MONOCYTES # BLD AUTO: 0.8 K/UL (ref 0.3–1)
MONOCYTES NFR BLD: 7.9 % (ref 4–15)
NEUTROPHILS # BLD AUTO: 5.8 K/UL (ref 1.8–7.7)
NEUTROPHILS NFR BLD: 55.1 % (ref 38–73)
NITRITE UR QL STRIP: NEGATIVE
NRBC BLD-RTO: 0 /100 WBC
PH UR STRIP: 6 [PH] (ref 5–8)
PLATELET # BLD AUTO: 285 K/UL (ref 150–350)
PMV BLD AUTO: 10.2 FL (ref 9.2–12.9)
POTASSIUM SERPL-SCNC: 3.5 MMOL/L (ref 3.5–5.1)
PROT SERPL-MCNC: 7.6 G/DL (ref 6–8.4)
PROT UR QL STRIP: NEGATIVE
RBC # BLD AUTO: 4.75 M/UL (ref 4–5.4)
RBC #/AREA URNS AUTO: 2 /HPF (ref 0–4)
SODIUM SERPL-SCNC: 140 MMOL/L (ref 136–145)
SP GR UR STRIP: 1.01 (ref 1–1.03)
SPECIMEN SOURCE: ABNORMAL
SQUAMOUS #/AREA URNS AUTO: 11 /HPF
T VAGINALIS GENITAL QL WET PREP: ABNORMAL
URN SPEC COLLECT METH UR: ABNORMAL
WBC # BLD AUTO: 10.61 K/UL (ref 3.9–12.7)
WBC #/AREA URNS AUTO: 3 /HPF (ref 0–5)
WBC #/AREA VAG WET PREP: ABNORMAL
YEAST GENITAL QL WET PREP: ABNORMAL

## 2020-03-14 PROCEDURE — 87210 SMEAR WET MOUNT SALINE/INK: CPT

## 2020-03-14 PROCEDURE — 99283 EMERGENCY DEPT VISIT LOW MDM: CPT | Mod: ,,, | Performed by: EMERGENCY MEDICINE

## 2020-03-14 PROCEDURE — 99283 PR EMERGENCY DEPT VISIT,LEVEL III: ICD-10-PCS | Mod: ,,, | Performed by: EMERGENCY MEDICINE

## 2020-03-14 PROCEDURE — 99283 EMERGENCY DEPT VISIT LOW MDM: CPT

## 2020-03-14 PROCEDURE — 80053 COMPREHEN METABOLIC PANEL: CPT

## 2020-03-14 PROCEDURE — 85025 COMPLETE CBC W/AUTO DIFF WBC: CPT

## 2020-03-14 PROCEDURE — 87491 CHLMYD TRACH DNA AMP PROBE: CPT

## 2020-03-14 RX ORDER — ACYCLOVIR 400 MG/1
400 TABLET ORAL 3 TIMES DAILY
Qty: 21 TABLET | Refills: 0 | Status: SHIPPED | OUTPATIENT
Start: 2020-03-14 | End: 2020-05-27

## 2020-03-14 RX ORDER — SULFAMETHOXAZOLE AND TRIMETHOPRIM 800; 160 MG/1; MG/1
1 TABLET ORAL 2 TIMES DAILY
Qty: 14 TABLET | Refills: 0 | Status: SHIPPED | OUTPATIENT
Start: 2020-03-14 | End: 2020-03-21

## 2020-03-14 NOTE — ED TRIAGE NOTES
Richard Dominique, a 34 y.o. female presents to the ED w/ complaint of Burning during urination. Pt denies blood, discharge, retention, fever chest pain.       Triage note:  Chief Complaint   Patient presents with    Abdominal Pain     Pt c/o lower mid quadrant abdominal pain x5 days. Pt denies hematuria and frequency but endorses dysuria. Pt also c/o mid lower back pain.      Review of patient's allergies indicates:  Allergies not on file  No past medical history on file.     Patient identifiers verified and correct for Richard Dominique     LOC: The patient is awake, alert and aware of environment with an appropriate affect, the patient is oriented x 3 and speaking appropriately.   APPEARANCE: Patient appears comfortable and in no acute distress, patient is clean and well groomed.  SKIN: The skin is warm and dry, color consistent with ethnicity, patient has normal skin turgor and moist mucus membranes, skin intact, no breakdown or bruising noted.   MUSCULOSKELETAL: Patient moving all extremities spontaneously, no swelling noted.  RESPIRATORY: Airway is open and patent, respirations are spontaneous, patient has a normal effort and rate, no accessory muscle use noted, pt placed on continuous pulse ox with O2 sats noted at 97% on room air.  CARDIAC: Pt placed on cardiac monitor. Patient has a normal rate and regular rhythm, no edema noted, capillary refill < 3 seconds.   GASTRO: Soft and non tender to palpation, no distention noted, normoactive bowel sounds present in all four quadrants. Pt states bowel movements have been regular.  : Pt has pain and burning with urination.  NEURO: Pt opens eyes spontaneously, behavior appropriate to situation, follows commands, facial expression symmetrical, bilateral hand grasp equal and even, purposeful motor response noted, normal sensation in all extremities when touched with a finger.

## 2020-03-14 NOTE — ED PROVIDER NOTES
Encounter Date: 3/14/2020    SCRIBE #1 NOTE: I, Tao Manjarrez, am scribing for, and in the presence of,  Dr. Slater. I have scribed the entire note.       History     Chief Complaint   Patient presents with    Female  Problem     vaginal 'fever and urine feels hot     Time patient was seen by the provider: 4:39 PM      The patient is a 34 y.o. female with history of  section and surgical history of tubal ligation, who presents to the ED with a complaint of vaginal pain for 6 days, described as burning. She was seen her yesterday for the same complaint (visit under MRN [id number]). No dysuria. No urinary frequency. No vaginal discharge. No history of similar symptoms. No fever. No nausea, vomiting, or diarrhea. No abdominal pain.     The history is provided by the patient. The history is limited by a language barrier. A  was used.     Review of patient's allergies indicates:   Allergen Reactions    Lactose Hives     Past Medical History:   Diagnosis Date    GERD (gastroesophageal reflux disease)     Gestational diabetes     Uses Azerbaijani as primary spoken language      Past Surgical History:   Procedure Laterality Date     SECTION      x2     SECTION WITH TUBAL LIGATION N/A 2019    Procedure:  SECTION, WITH TUBAL LIGATION;  Surgeon: Mercy Nguyen MD;  [facility]: [facility];  Service: OB/GYN;  Laterality: N/A;     Family History   Problem Relation Age of Onset    Breast cancer Neg Hx     Colon cancer Neg Hx     Ovarian cancer Neg Hx      Social History     Tobacco Use    Smoking status: Never Smoker    Smokeless tobacco: Never Used   Substance Use Topics    Alcohol use: No     Frequency: Never    Drug use: No     Review of Systems   Constitutional: Negative for fever.   Gastrointestinal: Negative for abdominal pain, diarrhea, nausea and vomiting.   Genitourinary: Positive for vaginal pain. Negative for dysuria, frequency and vaginal discharge.    All other systems reviewed and are negative.      Physical Exam     Initial Vitals [03/14/20 1501]   BP Pulse Resp Temp SpO2   108/75 75 18 98.5 °F (36.9 °C) 98 %      MAP       --         Physical Exam    Nursing note and vitals reviewed.  Constitutional: She appears well-developed and well-nourished. She is not diaphoretic. No distress.   Eyes: Pupils are equal, round, and reactive to light.   Neck: Normal range of motion. Neck supple.   Abdominal: Soft. She exhibits no distension. There is no tenderness.   Genitourinary: Vaginal discharge (thick white) found.   Genitourinary Comments: Vulva and perineum appear normal. No cervical motion tenderness. No adnexal tenderness. No lesions. The patient reports tenderness on the bilateral labia majora.          ED Course   Procedures  Labs Reviewed - No data to display       Imaging Results    None          Medical Decision Making:   History:   Old Medical Records: I decided to obtain old medical records.  Initial Assessment:   The patient is on bactrim for a UTI, however review of labs from yesterday were not necessarily consistent with a UTI. She had bacteria on vaginal screen, but it was otherwise negative. GC chlamydia were not sent. This could represent a presentation of genital herpes, despite the absence of lesions. I recommended that she continue her bactrim and we will give a trial of valacyclovir. She should follow up with her gynecologist.             Scribe Attestation:   Scribe #1: I performed the above scribed service and the documentation accurately describes the services I performed. I attest to the accuracy of the note.                          Clinical Impression:       ICD-10-CM ICD-9-CM   1. Vaginal pain R10.2 625.9         Disposition:   Disposition: Discharged  Condition: Stable     ED Disposition Condition    Discharge Stable        ED Prescriptions     Medication Sig Dispense Start Date End Date Auth. Provider    acyclovir (ZOVIRAX) 400 MG tablet  Take 1 tablet (400 mg total) by mouth 3 (three) times daily. for 7 days 21 tablet 3/14/2020 3/21/2020 Glynn Slater MD        Follow-up Information     Follow up With Specialties Details Why Contact Info Additional Information    [facility] Obstetrics and Gynecology In 1 week  [address] [address] [phone] 5th Floor MOB - [address]    [facility] Emergency Medicine  If symptoms worsen [address] [address] [phone]                         I, Dr. Maikel Slater, personally performed the services described in this documentation. All medical record entries made by the scribe were at my direction and in my presence.  I have reviewed the chart and agree that the record reflects my personal performance and is accurate and complete. Maikel Slater MD.             Glynn Slater MD  03/14/20 5757

## 2020-03-14 NOTE — ED PROVIDER NOTES
Encounter Date: 3/13/2020    SCRIBE #1 NOTE: I, Guillermina Tomlinson, am scribing for, and in the presence of,  Dr. Tavares. I have scribed the following portions of the note - Other sections scribed: HPI, ROS, PE.       History     Chief Complaint   Patient presents with    Abdominal Pain     Pt c/o lower mid quadrant abdominal pain x5 days. Pt denies hematuria and frequency but endorses dysuria. Pt also c/o mid lower back pain.      Time patient was seen by the provider: 11:51 PM    The patient is a 34 y.o. female with no pertinent medical history who presents to the ED with a complaint of abdominal pain. Patient reports lower abdominal pain, back pain, and dysuria x 5 days. She denies hematuria or vaginal discharge. She states she started using a vaginal cream yesterday with no improvement. She denies fever, cough, SOB, chest pain, leg swelling, nausea, vomiting, diarrhea. She is sexually active. No history of UTI. No history of STI.    The history is provided by the patient and medical records.     Review of patient's allergies indicates:  No Known Allergies  History reviewed. No pertinent past medical history.  No past surgical history on file.  History reviewed. No pertinent family history.  Social History     Tobacco Use    Smoking status: Not on file   Substance Use Topics    Alcohol use: Not on file    Drug use: Not on file     Review of Systems   Constitutional: Negative for fever.   HENT: Negative for sore throat.    Respiratory: Negative for shortness of breath.    Cardiovascular: Negative for chest pain.   Gastrointestinal: Positive for abdominal pain. Negative for diarrhea, nausea and vomiting.   Genitourinary: Positive for dysuria. Negative for vaginal bleeding and vaginal discharge.   Musculoskeletal: Positive for back pain.   Skin: Negative for rash.   Neurological: Negative for weakness.   Hematological: Does not bruise/bleed easily.       Physical Exam     Initial Vitals [03/13/20 2327]   BP Pulse  Resp Temp SpO2   132/72 71 16 98.3 °F (36.8 °C) 98 %      MAP       --         Physical Exam    Nursing note and vitals reviewed.  Constitutional: She appears well-developed and well-nourished. She is not diaphoretic. No distress.   HENT:   Head: Normocephalic and atraumatic.   Mouth/Throat: Oropharynx is clear and moist.   Eyes: Conjunctivae and EOM are normal.   Neck: Normal range of motion. Neck supple.   Cardiovascular: Normal rate and regular rhythm.   Pulmonary/Chest: Breath sounds normal. No respiratory distress.   Abdominal: Soft. She exhibits no distension. There is no guarding.   Tenderness to palpation to suprapubic, b/l lower quadrants and mild b/l CVA tenderness.    Genitourinary: Vagina normal.   Genitourinary Comments: Copious white cream in vaginal vault, no adnexal ttp, no CMT   Musculoskeletal: Normal range of motion. She exhibits no edema.   Neurological: She is alert and oriented to person, place, and time. No cranial nerve deficit.   Skin: Skin is warm and dry. No rash noted.         ED Course   Procedures  Labs Reviewed   CBC W/ AUTO DIFFERENTIAL - Abnormal; Notable for the following components:       Result Value    Mean Corpuscular Hemoglobin 26.3 (*)     Mean Corpuscular Hemoglobin Conc 31.1 (*)     RDW 14.6 (*)     All other components within normal limits   COMPREHENSIVE METABOLIC PANEL - Abnormal; Notable for the following components:    CO2 22 (*)     Glucose 121 (*)     All other components within normal limits   URINALYSIS, REFLEX TO URINE CULTURE - Abnormal; Notable for the following components:    Appearance, UA Cloudy (*)     All other components within normal limits    Narrative:     Preferred Collection Type->Urine, Clean Catch   VAGINAL SCREEN - Abnormal; Notable for the following components:    Bacteria - Vaginal Screen Occasional (*)     All other components within normal limits   URINALYSIS MICROSCOPIC - Abnormal; Notable for the following components:    Bacteria Moderate (*)      All other components within normal limits    Narrative:     Preferred Collection Type->Urine, Clean Catch   C. TRACHOMATIS/N. GONORRHOEAE BY AMP DNA   POCT URINE PREGNANCY          Imaging Results    None          Medical Decision Making:   History:   Old Medical Records: I decided to obtain old medical records.  Initial Assessment:   33 y/o F with lower abdominal pain and dysuria. Also notes back pain but has had back pain in the past.   ddx includes UTI, pyelonephritis, STI  Have considered but unlikely appy  White cream on pelvic exam - appears to cream pt used, do not suspect yeast infection or STI  Routine blood work, UA, POC urine pregnancy, vaginitis screen, GC/Ch ordered- will not treat empirically  Clinical Tests:   Lab Tests: Ordered and Reviewed  ED Management:  1:56 AM  Poor urine specimen given 11 SEC but moderate bacteria and sx c/w UTI possible early pyelo. pt well appearting, afebrile and has normal wbc. Will discharge home with PO abx. All questions answered and pt provided with routine return precautions.            Scribe Attestation:   Scribe #1: I performed the above scribed service and the documentation accurately describes the services I performed. I attest to the accuracy of the note.        Clinical Impression:       ICD-10-CM ICD-9-CM   1. Acute cystitis without hematuria N30.00 595.0         Disposition:   Disposition: Discharged  Condition: Stable     ED Disposition Condition    Discharge Stable        ED Prescriptions     Medication Sig Dispense Start Date End Date Auth. Provider    sulfamethoxazole-trimethoprim 800-160mg (BACTRIM DS) 800-160 mg Tab Take 1 tablet by mouth 2 (two) times daily. for 7 days 14 tablet 3/14/2020 3/21/2020 Liyah Tavares MD        Follow-up Information     Follow up With Specialties Details Why Contact [facility] Family Medicine Family Medicine Schedule an appointment as soon as possible for a visit   [address]  [address]  [phone]                                       Liyah Tavares MD  03/15/20 9336

## 2020-03-15 LAB
C TRACH DNA SPEC QL NAA+PROBE: NOT DETECTED
N GONORRHOEA DNA SPEC QL NAA+PROBE: NOT DETECTED

## 2020-04-05 ENCOUNTER — HOSPITAL ENCOUNTER (EMERGENCY)
Facility: HOSPITAL | Age: 35
Discharge: HOME | End: 2020-04-05
Attending: EMERGENCY MEDICINE

## 2020-04-05 VITALS
RESPIRATION RATE: 16 BRPM | TEMPERATURE: 98 F | HEART RATE: 72 BPM | DIASTOLIC BLOOD PRESSURE: 66 MMHG | OXYGEN SATURATION: 98 % | SYSTOLIC BLOOD PRESSURE: 116 MMHG

## 2020-04-05 DIAGNOSIS — R30.0 DYSURIA: Primary | ICD-10-CM

## 2020-04-05 LAB
B-HCG UR QL: NEGATIVE
BILIRUB UR QL STRIP: NEGATIVE
CLARITY UR REFRACT.AUTO: CLEAR
COLOR UR AUTO: NORMAL
CTP QC/QA: YES
GLUCOSE UR QL STRIP: NEGATIVE
HGB UR QL STRIP: NEGATIVE
KETONES UR QL STRIP: NEGATIVE
LEUKOCYTE ESTERASE UR QL STRIP: NEGATIVE
NITRITE UR QL STRIP: NEGATIVE
PH UR STRIP: 6 [PH] (ref 5–8)
PROT UR QL STRIP: NEGATIVE
SP GR UR STRIP: 1 (ref 1–1.03)
URN SPEC COLLECT METH UR: NORMAL

## 2020-04-05 PROCEDURE — 99285 EMERGENCY DEPT VISIT HI MDM: CPT | Mod: ,,, | Performed by: PHYSICIAN ASSISTANT

## 2020-04-05 PROCEDURE — 25000003 PHARM REV CODE 250: Performed by: PHYSICIAN ASSISTANT

## 2020-04-05 PROCEDURE — 81025 URINE PREGNANCY TEST: CPT | Performed by: PHYSICIAN ASSISTANT

## 2020-04-05 PROCEDURE — 99283 EMERGENCY DEPT VISIT LOW MDM: CPT

## 2020-04-05 PROCEDURE — 81003 URINALYSIS AUTO W/O SCOPE: CPT

## 2020-04-05 PROCEDURE — 99285 PR EMERGENCY DEPT VISIT,LEVEL V: ICD-10-PCS | Mod: ,,, | Performed by: PHYSICIAN ASSISTANT

## 2020-04-05 RX ORDER — CEPHALEXIN 500 MG/1
500 CAPSULE ORAL
Status: COMPLETED | OUTPATIENT
Start: 2020-04-05 | End: 2020-04-05

## 2020-04-05 RX ORDER — CEPHALEXIN 500 MG/1
500 CAPSULE ORAL EVERY 12 HOURS
Qty: 14 CAPSULE | Refills: 0 | Status: SHIPPED | OUTPATIENT
Start: 2020-04-05 | End: 2020-04-12

## 2020-04-05 RX ADMIN — CEPHALEXIN 500 MG: 500 CAPSULE ORAL at 08:04

## 2020-04-05 NOTE — ED PROVIDER NOTES
Encounter Date: 2020       History     Chief Complaint   Patient presents with    Urinary Tract Infection     Pt with UTI, out of bactrim.  Pt states remains with UTI symptoms.     34-year-old female with no medical history presents to the ED complaining of UTI symptoms for the past week.  She was seen in this ED 3 weeks ago, prescribed Bactrim which she completed.  She states that her symptoms resolved after taking the antibiotics but then returned 1 week ago.  She reports dysuria, urinary frequency, nocturia, and fever in her vagina.  She reports some lower abdominal discomfort.  She denies fever, chills, chest pain, shortness of breath, nausea, diarrhea, urinary urgency, vaginal bleeding.    The history is provided by the patient. The history is limited by a language barrier (Yi). A  was used ([employer]).     Review of patient's allergies indicates:   Allergen Reactions    Lactose Hives     Past Medical History:   Diagnosis Date    GERD (gastroesophageal reflux disease)     Gestational diabetes     Uses Singaporean as primary spoken language      Past Surgical History:   Procedure Laterality Date     SECTION      x2     SECTION WITH TUBAL LIGATION N/A 2019    Procedure:  SECTION, WITH TUBAL LIGATION;  Surgeon: Mercy Nguyen MD;  Location: [facility] L[facility];  Service: OB/GYN;  Laterality: N/A;     Family History   Problem Relation Age of Onset    Breast cancer Neg Hx     Colon cancer Neg Hx     Ovarian cancer Neg Hx      Social History     Tobacco Use    Smoking status: Never Smoker    Smokeless tobacco: Never Used   Substance Use Topics    Alcohol use: No     Frequency: Never    Drug use: No     Review of Systems   Constitutional: Negative for chills and fever.   HENT: Negative for congestion, rhinorrhea and sore throat.    Eyes: Negative for photophobia and visual disturbance.   Respiratory: Negative for shortness of breath and wheezing.     Cardiovascular: Negative for chest pain.   Gastrointestinal: Positive for abdominal pain. Negative for constipation, diarrhea, nausea and vomiting.   Genitourinary: Positive for dysuria, frequency and vaginal discharge (minimal). Negative for hematuria, urgency and vaginal bleeding.   Musculoskeletal: Positive for back pain. Negative for myalgias.   Skin: Negative for rash and wound.   Neurological: Negative for light-headedness, numbness and headaches.   Psychiatric/Behavioral: Negative for confusion.       Physical Exam     Initial Vitals [04/05/20 0822]   BP Pulse Resp Temp SpO2   116/66 72 16 98.3 °F (36.8 °C) 98 %      MAP       --         Physical Exam    Nursing note and vitals reviewed.  Constitutional: She appears well-developed and well-nourished. She is not diaphoretic. No distress.   HENT:   Head: Normocephalic and atraumatic.   Neck: Normal range of motion. Neck supple.   Cardiovascular: Normal rate, regular rhythm and normal heart sounds. Exam reveals no gallop and no friction rub.    No murmur heard.  Pulmonary/Chest: Breath sounds normal. She has no wheezes. She has no rhonchi. She has no rales.   Abdominal: Soft. Bowel sounds are normal. There is tenderness (minimal suprapubic tenderness). There is no rebound, no guarding and no CVA tenderness.   Musculoskeletal: Normal range of motion.   Neurological: She is alert and oriented to person, place, and time.   Skin: Skin is warm and dry. No rash noted. No erythema.   Psychiatric: She has a normal mood and affect.         ED Course   Procedures  Labs Reviewed   URINALYSIS, REFLEX TO URINE CULTURE    Narrative:     Preferred Collection Type->Urine, Clean Catch   POCT URINE PREGNANCY          Imaging Results    None          Medical Decision Making:   History:   Old Medical Records: I decided to obtain old medical records.  Old Records Summarized: records from previous admission(s).       <> Summary of Records: 3/13: seen in this ED c/o dysuria,  contaminated urine specimen, given bactrim. Gonorrhea/chlamydia negative  Clinical Tests:   Lab Tests: Ordered and Reviewed       APC / Resident Notes:   34-year-old female with no medical history presents to the ED complaining of UTI symptoms for the past week.  Vital signs stable.  Patient is well appearing.  Abdomen is soft and minimally tender in the suprapubic region.  No CVA tenderness.  Patient was seen 3 weeks ago with similar complaints, gonorrhea chlamydia negative at that time.  I do not feel that she needs repeat pelvic exam.  Will treat for UTI.    UPT negative.  UA with no evidence of infection but she is symptomatic so will treat. Given Keflex in the ED. Stable for discharge.    She was discharged with a prescription for keflex.  She will follow up with her PCP.  All of the patient's questions were answered.  I reviewed the patient's chart and labs.                              Clinical Impression:       ICD-10-CM ICD-9-CM   1. Dysuria R30.0 788.1         Disposition:   Disposition: Discharged  Condition: Stable     ED Disposition Condition    Discharge Stable        ED Prescriptions     Medication Sig Dispense Start Date End Date Auth. Provider    cephALEXin (KEFLEX) 500 MG capsule Take 1 capsule (500 mg total) by mouth every 12 (twelve) hours. for 7 days 14 capsule 4/5/2020 4/12/2020 Rita Hays PA-C        Follow-up Information     Follow up With Specialties Details Why Contact Info Additional Information    [facility] - Internal Medicine Internal Medicine   [address]  [facility] [address]  [phone] [facility].                                     Rita Hays PA-C  04/05/20 0924

## 2020-04-05 NOTE — ED TRIAGE NOTES
Richard Dominique, a 34 y.o. female presents to the ED via POV with CC UTI symptoms and vaginal discharge and bilateral lower back pain 2/10. Pt reports finished Bactrim 2-3 weeks and symptoms returned a week ago. Pt denies N/V. Denies Abdominal pain.       Patient identifiers verified verbally with name and  and correct for Richard Dominique.    LOC/ APPEARANCE: The patient is AAOx4. Pt is speaking appropriately, no slurred speech.Pt is clean and well groomed. No JVD visible. Pt reports pain level of 2/10. Pt updated on POC.   SKIN: Skin is warm dry and intact, and color is consistent with ethnicity. Capillary refill <3 seconds. No breakdown or brusing visible. Mucus membranes moist, acyanotic.  RESPIRATORY: Airway is open and patent. Respirations-spontaneous, unlabored, regular rate, equal bilaterally on inspiration and expiration. No accessory muscle use noted. Lungs clear to auscultation in all fields bilaterally anterior and posterior.   CARDIAC: Patient has regular heart rate.  No peripheral edema noted, and patient has no c/o chest pain. Peripheral pulses present equal and strong throughout.  ABDOMEN: Soft and non tender to palpation with no distention noted. Normoactive bowel sounds x4 quadrants. Pt has no complaints of abnormal bowel movements, denies blood in stool. Pt reports normal appetite.   NEUROLOGIC: Eyes open spontaneously and facial expression symmetrical. Pt behavior appropriate to situation, and pt follows commands. Pt reports sensation present in all extremities when touched with a finger, denies any numbness or tingling. PERRLA  MUSCULOSKELETAL: Spontaneous movement noted to all extremities. Hand  equal and leg strength strong +5 bilaterally.   : Has complaints of frequency, burning, urgency. Denies blood in the urine. No complaints of incontinence.

## 2020-04-25 ENCOUNTER — HOSPITAL ENCOUNTER (EMERGENCY)
Facility: HOSPITAL | Age: 35
Discharge: HOME | End: 2020-04-25
Attending: EMERGENCY MEDICINE
Payer: OTHER GOVERNMENT

## 2020-04-25 VITALS
BODY MASS INDEX: 26.68 KG/M2 | RESPIRATION RATE: 16 BRPM | DIASTOLIC BLOOD PRESSURE: 76 MMHG | SYSTOLIC BLOOD PRESSURE: 128 MMHG | HEIGHT: 62 IN | HEART RATE: 72 BPM | WEIGHT: 145 LBS | OXYGEN SATURATION: 99 % | TEMPERATURE: 98 F

## 2020-04-25 DIAGNOSIS — R30.0 DYSURIA: Primary | ICD-10-CM

## 2020-04-25 LAB
B-HCG UR QL: NEGATIVE
BILIRUB UR QL STRIP: NEGATIVE
BUN SERPL-MCNC: 6 MG/DL (ref 6–30)
CHLORIDE SERPL-SCNC: 104 MMOL/L (ref 95–110)
CLARITY UR REFRACT.AUTO: CLEAR
COLOR UR AUTO: YELLOW
CREAT SERPL-MCNC: 0.3 MG/DL (ref 0.5–1.4)
CTP QC/QA: YES
GLUCOSE SERPL-MCNC: 115 MG/DL (ref 70–110)
GLUCOSE UR QL STRIP: NEGATIVE
HCT VFR BLD CALC: 41 %PCV (ref 36–54)
HGB UR QL STRIP: NEGATIVE
KETONES UR QL STRIP: NEGATIVE
LEUKOCYTE ESTERASE UR QL STRIP: NEGATIVE
NITRITE UR QL STRIP: NEGATIVE
PH UR STRIP: 7 [PH] (ref 5–8)
POC IONIZED CALCIUM: 1.17 MMOL/L (ref 1.06–1.42)
POC TCO2 (MEASURED): 23 MMOL/L (ref 23–29)
POTASSIUM BLD-SCNC: 4 MMOL/L (ref 3.5–5.1)
PROT UR QL STRIP: NEGATIVE
SAMPLE: ABNORMAL
SARS-COV-2 RDRP RESP QL NAA+PROBE: NEGATIVE
SODIUM BLD-SCNC: 140 MMOL/L (ref 136–145)
SP GR UR STRIP: 1 (ref 1–1.03)
URN SPEC COLLECT METH UR: NORMAL

## 2020-04-25 PROCEDURE — U0002 COVID-19 LAB TEST NON-CDC: HCPCS

## 2020-04-25 PROCEDURE — 81025 URINE PREGNANCY TEST: CPT | Performed by: PHYSICIAN ASSISTANT

## 2020-04-25 PROCEDURE — 80047 BASIC METABLC PNL IONIZED CA: CPT

## 2020-04-25 PROCEDURE — 99285 EMERGENCY DEPT VISIT HI MDM: CPT | Mod: ,,, | Performed by: PHYSICIAN ASSISTANT

## 2020-04-25 PROCEDURE — 81003 URINALYSIS AUTO W/O SCOPE: CPT

## 2020-04-25 PROCEDURE — 99283 EMERGENCY DEPT VISIT LOW MDM: CPT | Mod: 25

## 2020-04-25 PROCEDURE — 99285 PR EMERGENCY DEPT VISIT,LEVEL V: ICD-10-PCS | Mod: ,,, | Performed by: PHYSICIAN ASSISTANT

## 2020-04-25 RX ORDER — PHENAZOPYRIDINE HYDROCHLORIDE 100 MG/1
100 TABLET, FILM COATED ORAL 3 TIMES DAILY PRN
COMMUNITY
End: 2020-05-27

## 2020-04-25 NOTE — ED PROVIDER NOTES
"Encounter Date: 2020       History     Chief Complaint   Patient presents with    Female  Problem     recently seen for dysuria, "fever in her vagina" and "feet getting hot"     This is a 34 year old female with a history of UTI who presents to the ED with a chief complaint of dysuria. Patient reports a 3 day history of burning with urination, frequency and subjective fever. Associated symptoms include back pain. She is taking pyridium without improvement in her symptoms. Patient also reports burning in her feet. The history is limited by a language barrier. She denies headache, URI symptoms, chest pain, cough, SOB, nausea, vomiting, abdominal pain, vaginal discharge, vaginal lesions, perianal numbness, incontinence, urinary retention, diarrhea, weakness or numbness.     The history is provided by the patient. The history is limited by a language barrier. A  was used.     Review of patient's allergies indicates:   Allergen Reactions    Lactose Hives     Past Medical History:   Diagnosis Date    GERD (gastroesophageal reflux disease)     Gestational diabetes     Uses Hebrew as primary spoken language      Past Surgical History:   Procedure Laterality Date     SECTION      x2     SECTION WITH TUBAL LIGATION N/A 2019    Procedure:  SECTION, WITH TUBAL LIGATION;  Surgeon: Mercy Nguyen MD;  [facility];  Service: OB/GYN;  Laterality: N/A;     Family History   Problem Relation Age of Onset    Breast cancer Neg Hx     Colon cancer Neg Hx     Ovarian cancer Neg Hx      Social History     Tobacco Use    Smoking status: Never Smoker    Smokeless tobacco: Never Used   Substance Use Topics    Alcohol use: No     Frequency: Never    Drug use: No     Review of Systems   Constitutional: Positive for fever.   HENT: Negative for congestion and sore throat.    Respiratory: Negative for cough and shortness of breath.    Cardiovascular: Negative for chest " pain.   Gastrointestinal: Negative for abdominal pain, nausea and vomiting.   Genitourinary: Positive for dysuria and frequency. Negative for genital sores.   Musculoskeletal: Positive for back pain.   Skin: Negative for rash.   Neurological: Positive for numbness (paresethesias of feet). Negative for weakness.       Physical Exam     Initial Vitals [04/25/20 1045]   BP Pulse Resp Temp SpO2   112/82 77 18 98.8 °F (37.1 °C) 99 %      MAP       --         Physical Exam    Constitutional: She appears well-developed and well-nourished. No distress.   HENT:   Head: Atraumatic.   Eyes: Conjunctivae and EOM are normal. Pupils are equal, round, and reactive to light.   Cardiovascular: Normal rate, regular rhythm and normal heart sounds.   Pulmonary/Chest: Breath sounds normal. No respiratory distress. She has no wheezes. She has no rhonchi. She has no rales.   Abdominal: Soft. Bowel sounds are normal. There is no tenderness. There is no rigidity, no rebound, no guarding and no CVA tenderness.   Neurological: She is alert and oriented to person, place, and time.   Skin: Skin is warm and dry. No rash noted.   Feet examined without swelling or rashes. Sensation to light touch intact.         ED Course   Procedures  Labs Reviewed   URINALYSIS, REFLEX TO URINE CULTURE   SARS-COV-2 RNA AMPLIFICATION, QUAL    Narrative:     What symptom criteria does the patient meet?->Fever   POCT URINE PREGNANCY   ISTAT CHEM8          Imaging Results    None          Medical Decision Making:   History:   Old Medical Records: I decided to obtain old medical records.  Old Records Summarized: records from clinic visits and records from previous admission(s).       <> Summary of Records:     ED visit 3/13/20 for cystitis, treated with Bactrim  ED visit 3/14/20 for similar complaint, trial of acyclovir for presumed herpes although no active lesions.   ED visit 4/5/20 for similar complaint, treated with Keflex   Telemedicine visit with outside  provider 4/16 and 4/22 with similar complaints, treated with Macrobid, Diflucan, pyridium  Clinical Tests:   Lab Tests: Ordered and Reviewed       APC / Resident Notes:   34 y.o. year old female presenting with dysuria.  On exam patient is afebrile and nontoxic. Abdomen is soft, nontender. No CVA tenderness. No edema.    DDx includes but is not limited to UTI, pregnancy, vaginitis, referred pain/radiculopathy.    Given patient was recently seen with pelvic exam for similar complaints with negative w/u do not feel that repeat pelvic exam is indicated emergently. Urinalysis is benign, pregnancy negative. COVID negative. Advised need for nonemergent f/u with OBGYN.     Findings and plan were reviewed with patient by . Return to ED precautions given. She is stable for discharge. I discussed the care of this patient with my supervising MD.                               Clinical Impression:       ICD-10-CM ICD-9-CM   1. Dysuria R30.0 788.1         Disposition:   Disposition: Discharged  Condition: Stable     ED Disposition Condition    Discharge Stable        ED Prescriptions     None        Follow-up Information     Follow up With Specialties Details Why Contact Info Additional Information    [facility], [place] [address] Outpatient Rehab Schedule an appointment as soon as possible for a visit   [address] [place] [id number] [phone] OB Gyn Physical Therapy - [facility], 4th Floor  Please park in [place] and use [address] elevators                                     Gogo Allen PA-C  04/25/20 3453

## 2020-04-25 NOTE — ED TRIAGE NOTES
Seen here 3/13/2020  for UTI . Continues to have burning w/ urination  And her feet feel hot.  Denies n/v/d/constipation.  -

## 2020-04-25 NOTE — DISCHARGE INSTRUCTIONS
Call to schedule a follow up appointment with your OBGYN in 1 week.     Return to the ER for fever >100.4F, abdominal pain, vomiting or any new or concerning symptoms.

## 2020-04-28 ENCOUNTER — NURSE TRIAGE (OUTPATIENT)
Dept: ADMINISTRATIVE | Facility: CLINIC | Age: 35
End: 2020-04-28

## 2020-04-28 NOTE — TELEPHONE ENCOUNTER
Caller requesting follow up with OB. She was seen in the ED for dysuria and was told to follow up with OB. Warm transfer to scheduling.      Reason for Disposition   Requesting regular office appointment    Protocols used: INFORMATION ONLY CALL-A-AH

## 2020-04-30 ENCOUNTER — OFFICE VISIT (OUTPATIENT)
Dept: OBSTETRICS AND GYNECOLOGY | Facility: CLINIC | Age: 35
End: 2020-04-30

## 2020-04-30 VITALS — BODY MASS INDEX: 28.63 KG/M2 | WEIGHT: 156.5 LBS | SYSTOLIC BLOOD PRESSURE: 112 MMHG | DIASTOLIC BLOOD PRESSURE: 86 MMHG

## 2020-04-30 DIAGNOSIS — R35.0 INCREASED FREQUENCY OF URINATION: ICD-10-CM

## 2020-04-30 DIAGNOSIS — Z86.32 HISTORY OF GESTATIONAL DIABETES: ICD-10-CM

## 2020-04-30 DIAGNOSIS — N89.8 VAGINAL IRRITATION: Primary | ICD-10-CM

## 2020-04-30 DIAGNOSIS — R20.8 BURNING SENSATION OF FOOT: ICD-10-CM

## 2020-04-30 PROCEDURE — 87481 CANDIDA DNA AMP PROBE: CPT | Mod: 59

## 2020-04-30 PROCEDURE — 87077 CULTURE AEROBIC IDENTIFY: CPT

## 2020-04-30 PROCEDURE — 99214 OFFICE O/P EST MOD 30 MIN: CPT | Mod: S$PBB,,, | Performed by: OBSTETRICS & GYNECOLOGY

## 2020-04-30 PROCEDURE — 87186 SC STD MICRODIL/AGAR DIL: CPT

## 2020-04-30 PROCEDURE — 99999 PR PBB SHADOW E&M-EST. PATIENT-LVL II: ICD-10-PCS | Mod: PBBFAC,,, | Performed by: OBSTETRICS & GYNECOLOGY

## 2020-04-30 PROCEDURE — 87491 CHLMYD TRACH DNA AMP PROBE: CPT

## 2020-04-30 PROCEDURE — 99999 PR PBB SHADOW E&M-EST. PATIENT-LVL II: CPT | Mod: PBBFAC,,, | Performed by: OBSTETRICS & GYNECOLOGY

## 2020-04-30 PROCEDURE — 83036 HEMOGLOBIN GLYCOSYLATED A1C: CPT

## 2020-04-30 PROCEDURE — 99212 OFFICE O/P EST SF 10 MIN: CPT | Mod: PBBFAC,PO | Performed by: OBSTETRICS & GYNECOLOGY

## 2020-04-30 PROCEDURE — 87661 TRICHOMONAS VAGINALIS AMPLIF: CPT

## 2020-04-30 PROCEDURE — 87088 URINE BACTERIA CULTURE: CPT

## 2020-04-30 PROCEDURE — 99214 PR OFFICE/OUTPT VISIT, EST, LEVL IV, 30-39 MIN: ICD-10-PCS | Mod: S$PBB,,, | Performed by: OBSTETRICS & GYNECOLOGY

## 2020-04-30 PROCEDURE — 87086 URINE CULTURE/COLONY COUNT: CPT

## 2020-04-30 NOTE — PROGRESS NOTES
Reason for visit: Follow-up    HPI:    34 y.o. female     No LMP recorded.     Pt seen in ED multiple times over the past 2 months for vaginal and urinary symptoms   She describes a vaginal burning over this time.  No discharge or odor.   She notes increased urinary frequency but denies dysuria.   3/14/2020: GC/chlamydia=(-)   2020: urinalysis=(-)   She has been treated with pyridium, diflucan, and macrobid at various points over the past 2 months with little relief of her symptoms   She also notes a burning sensation for about the past month on the soles of her feet   Of note, she had gDM in her most recent pregnancy.  She has not taken the PP 2-hr GTT.    Contraception: s/p BTL  Pap: 2018, NILM/HPV(-)        Past medical, surgical, social, family, and obstetric history: Reviewed and updated in EMR.  Medications: Reviewed and updated in EMR.  Allergies: Lactose and Milk containing products       Review of Systems   Constitutional: Negative for fever.   Gastrointestinal: Negative for abdominal pain, nausea and vomiting.   Genitourinary: Positive for frequency and vaginal pain. Negative for dysuria, menstrual problem, vaginal discharge and vaginal odor.   Musculoskeletal: Positive for myalgias.   Neurological: Negative for headaches.         Vitals: /86   Wt 71 kg (156 lb 8.4 oz)   BMI 28.63 kg/m²     Physical Exam:   Constitutional: She is oriented to person, place, and time. She appears well-developed.    HENT:   Head: Normocephalic.       Pulmonary/Chest: Effort normal.        Abdominal: She exhibits no mass. There is no hepatosplenomegaly. There is no tenderness. No hernia.     Genitourinary: Vagina normal. Uterus is not enlarged and not tender. Cervix is normal. Right adnexum displays no tenderness and no fullness. Left adnexum displays no tenderness and no fullness. No vaginal discharge found.   Genitourinary Comments: External genitalia: Normal  Urethra: No tenderness; normal  meatus  Bladder: No tenderness               Neurological: She is alert and oriented to person, place, and time.     Psychiatric: She has a normal mood and affect.         Assessment & Plan:    Vaginal irritation  -     C. trachomatis/N. gonorrhoeae by AMP DNA [facility]; Cervicovaginal  -     Vaginosis Screen by DNA Probe    Increased frequency of urination  -     Urine culture  -     Hemoglobin A1c; Future; Expected date: 04/30/2020    Burning sensation of foot  -     Hemoglobin A1c; Future; Expected date: 04/30/2020    History of gestational diabetes  -     Hemoglobin A1c; Future; Expected date: 04/30/2020         No obvious discharge on exam   Labs as above   Will treat based on results   Check urine culture for increased urinary frequency   Increased urinary frequency and burning sensation in feet is concerning for elevated BS.  Check A1c   Recommended OTC antifungals for possible athlete's foot as cause of the foot burning.   Recommended establishing with a PCP for further evaluation

## 2020-05-02 LAB
C TRACH DNA SPEC QL NAA+PROBE: NOT DETECTED
N GONORRHOEA DNA SPEC QL NAA+PROBE: NOT DETECTED

## 2020-05-04 LAB
BACTERIA UR CULT: ABNORMAL
BACTERIAL VAGINOSIS DNA: NEGATIVE
CANDIDA GLABRATA DNA: NEGATIVE
CANDIDA KRUSEI DNA: NEGATIVE
CANDIDA RRNA VAG QL PROBE: NEGATIVE
ESTIMATED AVG GLUCOSE: 126 MG/DL (ref 68–131)
HBA1C MFR BLD HPLC: 6 % (ref 4–5.6)
T VAGINALIS RRNA GENITAL QL PROBE: NEGATIVE

## 2020-05-06 ENCOUNTER — TELEPHONE (OUTPATIENT)
Dept: OBSTETRICS AND GYNECOLOGY | Facility: CLINIC | Age: 35
End: 2020-05-06

## 2020-05-06 ENCOUNTER — PATIENT MESSAGE (OUTPATIENT)
Dept: OBSTETRICS AND GYNECOLOGY | Facility: CLINIC | Age: 35
End: 2020-05-06

## 2020-05-06 DIAGNOSIS — N30.00 ACUTE CYSTITIS WITHOUT HEMATURIA: Primary | ICD-10-CM

## 2020-05-06 RX ORDER — SULFAMETHOXAZOLE AND TRIMETHOPRIM 800; 160 MG/1; MG/1
1 TABLET ORAL 2 TIMES DAILY
Qty: 14 TABLET | Refills: 0 | Status: SHIPPED | OUTPATIENT
Start: 2020-05-06 | End: 2020-05-13

## 2020-05-06 NOTE — TELEPHONE ENCOUNTER
Spoke to patient advising that medication has been sent to pharmacy. Also explained to patient that she needs to make an appt with her PCP to get prediabetes taken care of.   Patient verbalized understanding   ALEKSANDR Mora

## 2020-05-26 ENCOUNTER — HOSPITAL ENCOUNTER (EMERGENCY)
Facility: HOSPITAL | Age: 35
Discharge: HOME | End: 2020-05-26
Attending: EMERGENCY MEDICINE

## 2020-05-26 VITALS
HEART RATE: 78 BPM | BODY MASS INDEX: 26.43 KG/M2 | HEIGHT: 61 IN | DIASTOLIC BLOOD PRESSURE: 70 MMHG | TEMPERATURE: 99 F | OXYGEN SATURATION: 97 % | SYSTOLIC BLOOD PRESSURE: 113 MMHG | WEIGHT: 140 LBS | RESPIRATION RATE: 18 BRPM

## 2020-05-26 DIAGNOSIS — N94.10 DYSPAREUNIA, FEMALE: ICD-10-CM

## 2020-05-26 DIAGNOSIS — R10.2 SUPRAPUBIC DISCOMFORT: ICD-10-CM

## 2020-05-26 DIAGNOSIS — N76.1 CHRONIC VAGINITIS: Primary | ICD-10-CM

## 2020-05-26 DIAGNOSIS — N89.8 VAGINAL IRRITATION: ICD-10-CM

## 2020-05-26 LAB
B-HCG UR QL: NEGATIVE
BACTERIA #/AREA URNS AUTO: NORMAL /HPF
BACTERIA GENITAL QL WET PREP: ABNORMAL
BILIRUB UR QL STRIP: NEGATIVE
CLARITY UR REFRACT.AUTO: ABNORMAL
CLUE CELLS VAG QL WET PREP: ABNORMAL
COLOR UR AUTO: YELLOW
CTP QC/QA: YES
FILAMENT FUNGI VAG WET PREP-#/AREA: ABNORMAL
GLUCOSE UR QL STRIP: NEGATIVE
HGB UR QL STRIP: NEGATIVE
KETONES UR QL STRIP: NEGATIVE
LEUKOCYTE ESTERASE UR QL STRIP: NEGATIVE
MICROSCOPIC COMMENT: NORMAL
NITRITE UR QL STRIP: NEGATIVE
PH UR STRIP: 6 [PH] (ref 5–8)
PROT UR QL STRIP: NEGATIVE
RBC #/AREA URNS AUTO: 1 /HPF (ref 0–4)
SP GR UR STRIP: 1.01 (ref 1–1.03)
SPECIMEN SOURCE: ABNORMAL
SQUAMOUS #/AREA URNS AUTO: 6 /HPF
T VAGINALIS GENITAL QL WET PREP: ABNORMAL
URN SPEC COLLECT METH UR: ABNORMAL
WBC #/AREA URNS AUTO: 0 /HPF (ref 0–5)
WBC #/AREA VAG WET PREP: ABNORMAL
YEAST GENITAL QL WET PREP: ABNORMAL

## 2020-05-26 PROCEDURE — 81001 URINALYSIS AUTO W/SCOPE: CPT

## 2020-05-26 PROCEDURE — 87491 CHLMYD TRACH DNA AMP PROBE: CPT

## 2020-05-26 PROCEDURE — 99284 EMERGENCY DEPT VISIT MOD MDM: CPT | Mod: ,,, | Performed by: PHYSICIAN ASSISTANT

## 2020-05-26 PROCEDURE — 99284 PR EMERGENCY DEPT VISIT,LEVEL IV: ICD-10-PCS | Mod: ,,, | Performed by: PHYSICIAN ASSISTANT

## 2020-05-26 PROCEDURE — 99283 EMERGENCY DEPT VISIT LOW MDM: CPT

## 2020-05-26 PROCEDURE — 87210 SMEAR WET MOUNT SALINE/INK: CPT

## 2020-05-26 PROCEDURE — 81025 URINE PREGNANCY TEST: CPT | Performed by: PHYSICIAN ASSISTANT

## 2020-05-26 NOTE — ED PROVIDER NOTES
Encounter Date: 2020       History     Chief Complaint   Patient presents with    Vaginal Discharge     'infection     Ms Dominique is a 34yoF who presents for vaginal itching and discharge; pertinent PMHx gestational diabetes (most recent A1c 6), h/o C section with tubal ligation. Mauritanian-speaking only,  used for encounter.   Patient has been seen several times over the past few months for vaginal itching, white thin discharge, suprapubic pain and multiple rounds of Tx with diflucan and abx for UTIs. Last treated with diflucan . She has seen gynecology in the past month without other notable findings. Symptoms associated with dyspareunia, dysuria and vaginal burning. Denies associated mid-back pain, hematuria, vaginal bleeding, N/V/C/D, fever/chills. Sexually active with 1 partner, . No douching or harsh soaps.  The patients available PMH, PSH, Social History, medications, allergies, and triage vital signs were reviewed just prior to their medical evaluation.  A ten point review of systems was completed and is negative except as documented above.  Patient denies any other acute medical complaint.    Please be advised this text was dictated with [employer] software and may contain errors due to translation.           Review of patient's allergies indicates:   Allergen Reactions    Lactose Hives    Milk containing products      Past Medical History:   Diagnosis Date    GERD (gastroesophageal reflux disease)     Gestational diabetes     Uses Mauritanian as primary spoken language      Past Surgical History:   Procedure Laterality Date     SECTION      x2     SECTION WITH TUBAL LIGATION N/A 2019    Procedure:  SECTION, WITH TUBAL LIGATION;  Surgeon: Mercy Nguyen MD;  [facility];  Service: OB/GYN;  Laterality: N/A;     Family History   Problem Relation Age of Onset    Breast cancer Neg Hx     Colon cancer Neg Hx     Ovarian cancer Neg Hx       Social History     Tobacco Use    Smoking status: Never Smoker    Smokeless tobacco: Never Used   Substance Use Topics    Alcohol use: No     Frequency: Never    Drug use: No     Review of Systems   Constitutional: Negative for chills and fever.   Respiratory: Negative for shortness of breath.    Cardiovascular: Negative for chest pain.   Gastrointestinal: Positive for abdominal pain (suprapubic). Negative for constipation, diarrhea, nausea and vomiting.   Genitourinary: Positive for dyspareunia, dysuria and vaginal discharge. Negative for difficulty urinating, frequency, hematuria, menstrual problem, pelvic pain and vaginal bleeding. Vaginal pain: pruritis.   Skin: Negative for rash and wound.       Physical Exam     Initial Vitals [05/26/20 1424]   BP Pulse Resp Temp SpO2   113/70 78 18 98.6 °F (37 °C) 97 %      MAP       --         Physical Exam    Vitals reviewed.  Constitutional: She appears well-developed and well-nourished. She is not diaphoretic. No distress.   HENT:   Head: Normocephalic and atraumatic.   Right Ear: External ear normal.   Left Ear: External ear normal.   Eyes: Conjunctivae and EOM are normal. Pupils are equal, round, and reactive to light. No scleral icterus.   Cardiovascular: Normal rate, regular rhythm and intact distal pulses.   Pulmonary/Chest: Breath sounds normal. No respiratory distress. She has no wheezes. She has no rhonchi. She has no rales.   Abdominal: Soft. Bowel sounds are normal. There is tenderness (suprapubic). There is no rebound and no guarding.   Obese abdomen   Genitourinary: Pelvic exam was performed with patient supine. There is no tenderness or lesion on the right labia. There is no tenderness or lesion on the left labia. Uterus is tender (mild discomfort with suprapubic palpation). Uterus is not fixed. Cervix exhibits no motion tenderness. Right adnexum displays no tenderness. Left adnexum displays no tenderness. No bleeding in the vagina. Vaginal discharge  (mild, thin, white, non malodorous') found.   Musculoskeletal: She exhibits no edema.   Neurological: She is alert and oriented to person, place, and time.   Skin: Skin is warm and dry. Capillary refill takes less than 2 seconds. No rash noted. No erythema. No pallor.   Psychiatric: She has a normal mood and affect. Her behavior is normal. Judgment and thought content normal.         ED Course   Procedures  Labs Reviewed   URINALYSIS, REFLEX TO URINE CULTURE - Abnormal; Notable for the following components:       Result Value    Appearance, UA Hazy (*)     All other components within normal limits    Narrative:     Preferred Collection Type->Urine, Clean Catch   VAGINAL SCREEN - Abnormal; Notable for the following components:    Bacteria - Vaginal Screen Rare (*)     All other components within normal limits   C. TRACHOMATIS/N. GONORRHOEAE BY AMP DNA   URINALYSIS MICROSCOPIC    Narrative:     Preferred Collection Type->Urine, Clean Catch   POCT URINE PREGNANCY          Imaging Results    None          Medical Decision Making:   History:   Old Medical Records: I decided to obtain old medical records.  Old Records Summarized: records from clinic visits and records from previous admission(s).  Initial Assessment:   Patient returns for continued symptoms of vaginal pruritis with thin-white discharge, mild suprapubic tenderness without CMT. VSS, afebrile. Multiple episodes of similar symptoms despite diflucan therapy  Differential Diagnosis:   DDx vaginitis d/t yeast, atrophy, allergy; UTI, urethritis. Physical exam and history taking lower clinical suspicion for PID, TOA, acute abdomen.   Clinical Tests:   Lab Tests: Ordered and Reviewed  ED Management:  UA without evidence of infection. Wet prep unremarkable. GC pending, though I do not clinically suspect at this  Time (negative swabs two weeks prior, no new partners). On chart review, patient has had ED visits for vaginitis and suprapubic discomfort multiple times  over the past few months. Vaginitis may be 2/2 other cause. A1c mildly elevated, recommended low sugar/carb diet with frequent underwear changes, gentle soap use. Patient stable for continuation of workup outside of ED. Given resources for low-cost GYN, as patient has financial concerns. Discharge instructions reviewed with , all questions answered. Patient agreed to plan of care and voiced understanding. Discharged in stable condition with strict ED return precautions.    Carmela Pino PA-C  05/27/2020    I discussed the following case, diagnosis and plan of care with attending physician.                                   Clinical Impression:       ICD-10-CM ICD-9-CM   1. Chronic vaginitis N76.1 616.10   2. Vaginal irritation N89.8 623.9   3. Dyspareunia, female N94.10 625.0   4. Suprapubic discomfort R10.2 789.09         Disposition:   Disposition: Discharged  Condition: Stable     ED Disposition Condition    Discharge Stable        ED Prescriptions     None        Follow-up Information     Follow up With Specialties Details Why Contact Info    [facility] Emergency Medicine Go to  Return to the ER immediately, If symptoms worsen or new symptoms occur [address] [id number] [phone]                                     Carmela Pino PA-C  05/27/20 1038

## 2020-05-26 NOTE — ED NOTES
Per , has been seen mult times for same, was on antibiotic for UTI, then yeast infection,currently with same symptoms, vaginal burning, odor and thick white discharge x 3 months. Pain with intercourse

## 2020-05-26 NOTE — DISCHARGE INSTRUCTIONS
No identificamos ninguna infección (bacteria o levadura) que cause danae síntomas. Tiene vaginitis de causa desconocida en sree momento. Leonardo un seguimiento con ginecología para un trabajo continuo (consulte la lista de clínicas). Continuar las recomendaciones de cambios frecuentes de ropa interior, dieta baja en azúcar / carbohidratos. Tampoco tiene ninguna infección en la orina.  Busque atención médica de urgencia si desarrolla fiebre, sangrado vaginal significativo o dolor nuevo.    Clínicas:  hijas de la paul- [phone]  [facility]- [phone]    Nuestro objetivo en el departamento de emergencias es brindarle siempre lexi atención excepcional y un servicio excepcional. Puede recibir lexi encuesta por correo o correo electrónico la próxima semana con respecto a timmons experiencia en nuestro servicio de urgencias. Agradeceríamos enormemente que complete y devuelva la encuesta. Danae comentarios nos brindan lexi manera de reconocer a nuestro personal que aime muy buena atención y nos ayuda a aprender cómo mejorar cuando timmons experiencia estuvo por debajo de nuestra aspiración de excelencia.

## 2020-05-26 NOTE — ED NOTES
Patient identifiers verified and correct for Ms Lisa Dominique  C/C: Burning, odor and vaginal itching SEE NN  APPEARANCE: awake and alert in NAD.  SKIN: warm, dry and intact. No breakdown or bruising.  MUSCULOSKELETAL: Patient moving all extremities spontaneously, no obvious swelling or deformities noted. Ambulates independently.  RESPIRATORY: Denies shortness of breath.Respirations unlabored.   CARDIAC: Denies CP, 2+ distal pulses; no peripheral edema  ABDOMEN: States lower abd pain, positive nausea, no emesis  : voids spontaneously, denies difficulty, states odor, itching and white colored discharge  Neurologic: AAO x 4; follows commands equal strength in all extremities; denies numbness/tingling. Denies dizziness Denies weakness

## 2020-05-27 ENCOUNTER — OFFICE VISIT (OUTPATIENT)
Dept: OBSTETRICS AND GYNECOLOGY | Facility: CLINIC | Age: 35
End: 2020-05-27
Payer: OTHER GOVERNMENT

## 2020-05-27 VITALS — DIASTOLIC BLOOD PRESSURE: 66 MMHG | BODY MASS INDEX: 29.95 KG/M2 | WEIGHT: 158.5 LBS | SYSTOLIC BLOOD PRESSURE: 102 MMHG

## 2020-05-27 DIAGNOSIS — N89.8 VAGINAL IRRITATION: ICD-10-CM

## 2020-05-27 DIAGNOSIS — Z87.440 HISTORY OF UTI: Primary | ICD-10-CM

## 2020-05-27 DIAGNOSIS — R30.0 DYSURIA: ICD-10-CM

## 2020-05-27 DIAGNOSIS — R10.2 SUPRAPUBIC PAIN: ICD-10-CM

## 2020-05-27 DIAGNOSIS — R73.09 ELEVATED HEMOGLOBIN A1C: ICD-10-CM

## 2020-05-27 LAB
BILIRUB SERPL-MCNC: NEGATIVE MG/DL
BLOOD URINE, POC: NEGATIVE
C TRACH DNA SPEC QL NAA+PROBE: NOT DETECTED
COLOR, POC UA: YELLOW
GLUCOSE UR QL STRIP: NORMAL
KETONES UR QL STRIP: NEGATIVE
LEUKOCYTE ESTERASE URINE, POC: NEGATIVE
N GONORRHOEA DNA SPEC QL NAA+PROBE: NOT DETECTED
NITRITE, POC UA: NEGATIVE
PH, POC UA: 7
PROTEIN, POC: NORMAL
SPECIFIC GRAVITY, POC UA: 1.01
UROBILINOGEN, POC UA: NORMAL

## 2020-05-27 PROCEDURE — 87186 SC STD MICRODIL/AGAR DIL: CPT

## 2020-05-27 PROCEDURE — 99212 OFFICE O/P EST SF 10 MIN: CPT | Mod: S$PBB,,, | Performed by: OBSTETRICS & GYNECOLOGY

## 2020-05-27 PROCEDURE — 87088 URINE BACTERIA CULTURE: CPT

## 2020-05-27 PROCEDURE — 87481 CANDIDA DNA AMP PROBE: CPT | Mod: 59

## 2020-05-27 PROCEDURE — 99212 OFFICE O/P EST SF 10 MIN: CPT | Mod: PBBFAC,PO | Performed by: OBSTETRICS & GYNECOLOGY

## 2020-05-27 PROCEDURE — 99212 PR OFFICE/OUTPT VISIT, EST, LEVL II, 10-19 MIN: ICD-10-PCS | Mod: S$PBB,,, | Performed by: OBSTETRICS & GYNECOLOGY

## 2020-05-27 PROCEDURE — 99999 PR PBB SHADOW E&M-EST. PATIENT-LVL II: CPT | Mod: PBBFAC,,, | Performed by: OBSTETRICS & GYNECOLOGY

## 2020-05-27 PROCEDURE — 99999 PR PBB SHADOW E&M-EST. PATIENT-LVL II: ICD-10-PCS | Mod: PBBFAC,,, | Performed by: OBSTETRICS & GYNECOLOGY

## 2020-05-27 PROCEDURE — 87086 URINE CULTURE/COLONY COUNT: CPT

## 2020-05-27 PROCEDURE — 81002 URINALYSIS NONAUTO W/O SCOPE: CPT | Mod: PBBFAC,PO | Performed by: OBSTETRICS & GYNECOLOGY

## 2020-05-27 PROCEDURE — 87077 CULTURE AEROBIC IDENTIFY: CPT

## 2020-05-27 RX ORDER — AMOXICILLIN AND CLAVULANATE POTASSIUM 875; 125 MG/1; MG/1
1 TABLET, FILM COATED ORAL EVERY 12 HOURS
Qty: 14 TABLET | Refills: 0 | Status: SHIPPED | OUTPATIENT
Start: 2020-05-27 | End: 2020-06-03

## 2020-05-27 RX ORDER — FLUCONAZOLE 150 MG/1
150 TABLET ORAL ONCE
Qty: 1 TABLET | Refills: 1 | Status: SHIPPED | OUTPATIENT
Start: 2020-05-27 | End: 2020-05-27

## 2020-05-27 NOTE — PROGRESS NOTES
History & Physical  Gynecology      SUBJECTIVE:     Chief Complaint: Vaginal Itching; Vaginal Burning; Vaginal Discharge; and Abdominal Pain     History of Present Illness:  Richard Dominique is a 34 y.o.  who presents for vaginal irritation and lower abdominal pain. Onset was about 1 month ago. Was found to have UTI and symptoms improved after a week of Bactrim. Symptoms returned after 1 week. Now complains of burning in her vagina worse with urination as well as itching. Has a clear stringy discharge. Was seen in ED for these issues. UPT negative yesterday. Wet prep and UA in ED neg.       Review of patient's allergies indicates:   Allergen Reactions    Lactose Hives    Milk containing products        Past Medical History:   Diagnosis Date    GERD (gastroesophageal reflux disease)     Gestational diabetes     Uses Arabic as primary spoken language      Past Surgical History:   Procedure Laterality Date     SECTION      x2     SECTION WITH TUBAL LIGATION N/A 2019    Procedure:  SECTION, WITH TUBAL LIGATION;  Surgeon: Mercy Nguyen MD;  Location: [facility];  Service: OB/GYN;  Laterality: N/A;     OB History        3    Para   3    Term   1       0    AB   0    Living   1       SAB   0    TAB   0    Ectopic   0    Multiple        Live Births   1               Family History   Problem Relation Age of Onset    Breast cancer Neg Hx     Colon cancer Neg Hx     Ovarian cancer Neg Hx      Social History     Tobacco Use    Smoking status: Never Smoker    Smokeless tobacco: Never Used   Substance Use Topics    Alcohol use: No     Frequency: Never    Drug use: No       Current Outpatient Medications   Medication Sig    acyclovir (ZOVIRAX) 400 MG tablet Take 1 tablet (400 mg total) by mouth 3 (three) times daily. for 7 days    ibuprofen (ADVIL,MOTRIN) 800 MG tablet Take 1 tablet (800 mg total) by mouth every 6 (six) hours as needed for Pain.     phenazopyridine (PYRIDIUM) 100 MG tablet Take 100 mg by mouth 3 (three) times daily as needed for Pain.     No current facility-administered medications for this visit.          Review of Systems:  Review of Systems   Constitutional: Negative for chills and fever.   Eyes: Negative for visual disturbance.   Respiratory: Negative for shortness of breath.    Cardiovascular: Negative for chest pain and leg swelling.   Gastrointestinal: Positive for abdominal pain. Negative for nausea and vomiting.   Endocrine: Positive for diabetes.   Genitourinary: Positive for dysuria, pelvic pain, vaginal discharge and vaginal pain. Negative for vaginal bleeding.   Integumentary:  Negative for rash.   Neurological: Positive for numbness (paresthesias in BLE). Negative for headaches.        OBJECTIVE:     Physical Exam:  Physical Exam   Constitutional: She is oriented to person, place, and time. She appears well-developed and well-nourished. No distress.   Cardiovascular: Normal rate.   Pulmonary/Chest: Effort normal. No respiratory distress.   Abdominal: Soft. She exhibits no distension. There is tenderness (suprapubic tenderness). There is no rebound and no guarding.   Genitourinary:   Genitourinary Comments: Normal external female genitalia, normal hair distribution. Vaginal mucosa pink, moist, well rugated, scant clear physiologic discharge. No blood in vault. Cervix pink, non-friable, without lesion. No CMT. Uterus non tender, mobile, not enlarged. Adnexa without fullness or tenderness.    Musculoskeletal: Normal range of motion. She exhibits no edema.   Neurological: She is alert and oriented to person, place, and time.   Skin: Skin is warm and dry.   Psychiatric: She has a normal mood and affect.         ASSESSMENT:       ICD-10-CM ICD-9-CM    1. History of UTI Z87.440 V13.02 POCT URINE DIPSTICK WITHOUT MICROSCOPE      Urine culture   2. Vaginal irritation N89.8 623.9 POCT URINE DIPSTICK WITHOUT MICROSCOPE      Vaginosis Screen  by DNA Probe      Urine culture          Plan:      Richard was seen today for vaginal itching, vaginal burning, vaginal discharge and abdominal pain.    Diagnoses and all orders for this visit:    History of UTI  -     POCT URINE DIPSTICK WITHOUT MICROSCOPE  -     Urine culture    Vaginal irritation  -     POCT URINE DIPSTICK WITHOUT MICROSCOPE  -     Vaginosis Screen by DNA Probe  -     Urine culture  -     amoxicillin-clavulanate 875-125mg (AUGMENTIN) 875-125 mg per tablet; Take 1 tablet by mouth every 12 (twelve) hours. for 7 days  -     fluconazole (DIFLUCAN) 150 MG Tab; Take 1 tablet (150 mg total) by mouth once. REFILL AND RE-DOSE IF SYMPTOMS RECUR for 1 dose    - Pt has h/o UTI with klebsiella (pansensitive) growing at time of normal UA  - Will treat empircally based on symptoms and f/u UA  - ED collected GCCT which is in process    Elevated Hemoglobin A1c  - Patient given information to establish care at [facility]  - Discussed this could be related to the paresthesias in her feet and she should see them sooner rather than later  - Discussed could make patient prone to yeast infection, diflucan rx provided for post-abx ppx      Jerrica Meier

## 2020-05-28 LAB
BACTERIAL VAGINOSIS DNA: NEGATIVE
CANDIDA GLABRATA DNA: NEGATIVE
CANDIDA KRUSEI DNA: NEGATIVE
CANDIDA RRNA VAG QL PROBE: NEGATIVE
T VAGINALIS RRNA GENITAL QL PROBE: NEGATIVE

## 2020-05-29 LAB — BACTERIA UR CULT: ABNORMAL

## 2020-06-10 ENCOUNTER — TELEPHONE (OUTPATIENT)
Dept: OBSTETRICS AND GYNECOLOGY | Facility: CLINIC | Age: 35
End: 2020-06-10

## 2020-06-10 DIAGNOSIS — N30.00 ACUTE CYSTITIS WITHOUT HEMATURIA: Primary | ICD-10-CM

## 2020-06-10 RX ORDER — NITROFURANTOIN 25; 75 MG/1; MG/1
100 CAPSULE ORAL 2 TIMES DAILY
Qty: 14 CAPSULE | Refills: 0 | Status: SHIPPED | OUTPATIENT
Start: 2020-06-10 | End: 2020-06-17

## 2020-06-10 RX ORDER — CEFTRIAXONE 250 MG/1
250 INJECTION, POWDER, FOR SOLUTION INTRAMUSCULAR; INTRAVENOUS DAILY
Qty: 250 MG | Refills: 0 | Status: SHIPPED | OUTPATIENT
Start: 2020-06-10 | End: 2020-06-11

## 2020-06-10 NOTE — TELEPHONE ENCOUNTER
Language Line #[phone]    Called patient to discuss multi-drug resistant urine culture. Previously prescribed Augmentin (intermediate susceptibility).  Patient will require Macrobid x 7d as well as Rocephin IM. Discussed with patient she will need to  injection and bring it to injection clinic for administration.  Patient voices understanding.    Jerrica Lazaro MD  OB/GYN, PGY-4

## 2020-06-12 ENCOUNTER — CLINICAL SUPPORT (OUTPATIENT)
Dept: OBSTETRICS AND GYNECOLOGY | Facility: CLINIC | Age: 35
End: 2020-06-12
Payer: OTHER GOVERNMENT

## 2020-06-12 DIAGNOSIS — N30.00 ACUTE CYSTITIS WITHOUT HEMATURIA: Primary | ICD-10-CM

## 2020-06-12 PROCEDURE — 99999 PR PBB SHADOW E&M-EST. PATIENT-LVL I: ICD-10-PCS | Mod: PBBFAC,,,

## 2020-06-12 PROCEDURE — 96372 THER/PROPH/DIAG INJ SC/IM: CPT | Mod: PBBFAC

## 2020-06-12 PROCEDURE — 99999 PR PBB SHADOW E&M-EST. PATIENT-LVL I: CPT | Mod: PBBFAC,,,

## 2020-06-12 RX ORDER — CEFTRIAXONE 250 MG/1
250 INJECTION, POWDER, FOR SOLUTION INTRAMUSCULAR; INTRAVENOUS ONCE
Status: COMPLETED | OUTPATIENT
Start: 2020-06-12 | End: 2020-06-12

## 2020-06-12 RX ADMIN — CEFTRIAXONE 250 MG: 250 INJECTION, POWDER, FOR SOLUTION INTRAMUSCULAR; INTRAVENOUS at 01:06

## 2020-06-12 NOTE — PROGRESS NOTES
Here for rocephin injection, patient without complaints at this time. Reviewed allergies. Injection given. Advised to wait in lobby for 20 minutes after injection and report any adverse reactions. No pain noted prior to or after injection.     Order verified, inspected package,storage verified,expiration verified    Patient supplied medication, see Med Card for information.    Site:

## 2021-04-16 ENCOUNTER — PATIENT MESSAGE (OUTPATIENT)
Dept: RESEARCH | Facility: HOSPITAL | Age: 36
End: 2021-04-16

## 2021-09-21 NOTE — NURSING
Pt to receive tdap  Order reviewed  Pt confirmed name and   nkda  Tdap  Adacel  [employer]  Lot# [id number]  Exp date 12/15/20  Given left deltoid  Pt tolerated well  Instructed to remain in clinic 15 min  Pt verbalized understanding   Private car

## 2023-12-26 ENCOUNTER — HOSPITAL ENCOUNTER (EMERGENCY)
Facility: HOSPITAL | Age: 38
Discharge: HOME | End: 2023-12-26
Attending: EMERGENCY MEDICINE

## 2023-12-26 VITALS
SYSTOLIC BLOOD PRESSURE: 117 MMHG | TEMPERATURE: 98 F | RESPIRATION RATE: 16 BRPM | DIASTOLIC BLOOD PRESSURE: 68 MMHG | HEART RATE: 70 BPM | OXYGEN SATURATION: 98 %

## 2023-12-26 DIAGNOSIS — N76.0 BACTERIAL VAGINOSIS: ICD-10-CM

## 2023-12-26 DIAGNOSIS — B96.89 BACTERIAL VAGINOSIS: ICD-10-CM

## 2023-12-26 DIAGNOSIS — N30.00 ACUTE CYSTITIS WITHOUT HEMATURIA: Primary | ICD-10-CM

## 2023-12-26 LAB
B-HCG UR QL: NEGATIVE
BACTERIA #/AREA URNS AUTO: NORMAL /HPF
BACTERIA GENITAL QL WET PREP: ABNORMAL
BILIRUB UR QL STRIP: ABNORMAL
CLARITY UR REFRACT.AUTO: CLEAR
CLUE CELLS VAG QL WET PREP: ABNORMAL
COLOR UR AUTO: YELLOW
CTP QC/QA: YES
FILAMENT FUNGI VAG WET PREP-#/AREA: ABNORMAL
GLUCOSE UR QL STRIP: NEGATIVE
HGB UR QL STRIP: NEGATIVE
KETONES UR QL STRIP: NEGATIVE
LEUKOCYTE ESTERASE UR QL STRIP: NEGATIVE
MICROSCOPIC COMMENT: NORMAL
NITRITE UR QL STRIP: POSITIVE
PH UR STRIP: 7 [PH] (ref 5–8)
PROT UR QL STRIP: NEGATIVE
RBC #/AREA URNS AUTO: 1 /HPF (ref 0–4)
SP GR UR STRIP: 1.01 (ref 1–1.03)
SPECIMEN SOURCE: ABNORMAL
SQUAMOUS #/AREA URNS AUTO: 2 /HPF
T VAGINALIS GENITAL QL WET PREP: ABNORMAL
URN SPEC COLLECT METH UR: ABNORMAL
WBC #/AREA URNS AUTO: 2 /HPF (ref 0–5)
WBC #/AREA VAG WET PREP: ABNORMAL
YEAST GENITAL QL WET PREP: ABNORMAL

## 2023-12-26 PROCEDURE — 87210 SMEAR WET MOUNT SALINE/INK: CPT

## 2023-12-26 PROCEDURE — 63600175 PHARM REV CODE 636 W HCPCS

## 2023-12-26 PROCEDURE — 99284 EMERGENCY DEPT VISIT MOD MDM: CPT

## 2023-12-26 PROCEDURE — 81001 URINALYSIS AUTO W/SCOPE: CPT | Performed by: EMERGENCY MEDICINE

## 2023-12-26 PROCEDURE — 96372 THER/PROPH/DIAG INJ SC/IM: CPT

## 2023-12-26 PROCEDURE — 81025 URINE PREGNANCY TEST: CPT | Performed by: EMERGENCY MEDICINE

## 2023-12-26 PROCEDURE — 87491 CHLMYD TRACH DNA AMP PROBE: CPT

## 2023-12-26 PROCEDURE — 25000003 PHARM REV CODE 250

## 2023-12-26 RX ORDER — NITROFURANTOIN 25; 75 MG/1; MG/1
100 CAPSULE ORAL
Status: DISCONTINUED | OUTPATIENT
Start: 2023-12-26 | End: 2023-12-26

## 2023-12-26 RX ORDER — METRONIDAZOLE 7.5 MG/G
1 GEL VAGINAL 2 TIMES DAILY
Qty: 5 APPLICATOR | Refills: 0 | Status: SHIPPED | OUTPATIENT
Start: 2023-12-26 | End: 2023-12-31

## 2023-12-26 RX ORDER — DOXYCYCLINE 100 MG/1
100 CAPSULE ORAL 2 TIMES DAILY
Qty: 20 CAPSULE | Refills: 0 | Status: SHIPPED | OUTPATIENT
Start: 2023-12-26 | End: 2024-01-05

## 2023-12-26 RX ORDER — NITROFURANTOIN 25; 75 MG/1; MG/1
100 CAPSULE ORAL 2 TIMES DAILY
Qty: 10 CAPSULE | Refills: 0 | Status: SHIPPED | OUTPATIENT
Start: 2023-12-26 | End: 2023-12-26 | Stop reason: SDUPTHER

## 2023-12-26 RX ORDER — CEFTRIAXONE 500 MG/1
500 INJECTION, POWDER, FOR SOLUTION INTRAMUSCULAR; INTRAVENOUS
Status: COMPLETED | OUTPATIENT
Start: 2023-12-26 | End: 2023-12-26

## 2023-12-26 RX ORDER — DOXYCYCLINE HYCLATE 100 MG
100 TABLET ORAL
Status: COMPLETED | OUTPATIENT
Start: 2023-12-26 | End: 2023-12-26

## 2023-12-26 RX ORDER — PHENAZOPYRIDINE HYDROCHLORIDE 100 MG/1
200 TABLET, FILM COATED ORAL
Status: DISCONTINUED | OUTPATIENT
Start: 2023-12-26 | End: 2023-12-26

## 2023-12-26 RX ADMIN — DOXYCYCLINE HYCLATE 100 MG: 100 TABLET, COATED ORAL at 01:12

## 2023-12-26 RX ADMIN — CEFTRIAXONE SODIUM 500 MG: 500 INJECTION, POWDER, FOR SOLUTION INTRAMUSCULAR; INTRAVENOUS at 01:12

## 2023-12-26 NOTE — ED PROVIDER NOTES
Encounter Date: 2023       History     Chief Complaint   Patient presents with    Hematuria     Pt reports hematuria with some pain and frequency.      38-year-old female with past medical history of gestational diabetes (most recent A1c 6) and previous C section with tubal ligation patient now presents with chief complaint of hematuria and vaginal itching.  Patient reports that for the last 3 days she has been experiencing darkening urine concerning for blood with vaginal discharge and itching.  Additionally patient reports some lower back pain but denies any systemic fevers.     The history is provided by the patient. The history is limited by a language barrier.     Review of patient's allergies indicates:   Allergen Reactions    Lactose Hives    Milk containing products (dairy)      Past Medical History:   Diagnosis Date    GERD (gastroesophageal reflux disease)     Gestational diabetes     Uses Hungarian as primary spoken language      Past Surgical History:   Procedure Laterality Date     SECTION      x2     SECTION WITH TUBAL LIGATION N/A 2019    Procedure:  SECTION, WITH TUBAL LIGATION;  Surgeon: Mercy Nguyen MD;  Location: [facility];  Service: OB/GYN;  Laterality: N/A;     Family History   Problem Relation Age of Onset    Breast cancer Neg Hx     Colon cancer Neg Hx     Ovarian cancer Neg Hx      Social History     Tobacco Use    Smoking status: Never    Smokeless tobacco: Never   Substance Use Topics    Alcohol use: No    Drug use: No     Review of Systems  See HPI    Physical Exam     Initial Vitals [23 0019]   BP Pulse Resp Temp SpO2   121/63 63 16 98.6 °F (37 °C) 98 %      MAP       --         Physical Exam    Nursing note and vitals reviewed.      Gen: AxOx3, well nourished, appears stated age, no pallor, no jaundice, appears well hydrated  Eye: EOMI, no scleral icterus, no periorbital edema or ecchymosis  Head: Normocephalic, atraumatic, no lesions, scalp  appears normal  ENT: Neck supple, no stridor, no masses, no drooling or voice changes  CVS: All distal pulses intact with normal rate and rhythm, no JVD, normal S1/S2, no murmur  Pulm: Normal breath sounds, no wheezes, rales or rhonchi, no increased work of breathing  Abd:  Nondistended, soft, tenderness to palpation over suprapubic region without guarding or rebound tenderness, no organomegaly, no CVAT.   :  Performed with female chaperone at bedside.  Normal perineum and vulva.  No blood in vaginal vault.  White cervical discharge noted.  Cervix appears normal without erythema.  Cervical motion tenderness present.  No palpable adnexa or adnexal tenderness.  Ext: No edema, no lesions, rashes, or deformity  Neuro: GCS15, moving all extremities, gait intact, face grossly symmetric  Psych: normal affect, cooperative, well groomed, makes good eye contact      ED Course   Procedures  Labs Reviewed   VAGINAL SCREEN - Abnormal; Notable for the following components:       Result Value    Clue Cells Rare (*)     WBC - Vaginal Screen Rare (*)     Bacteria - Vaginal Screen Moderate (*)     All other components within normal limits    Narrative:     Release to patient->Immediate   URINALYSIS, REFLEX TO URINE CULTURE - Abnormal; Notable for the following components:    Bilirubin (UA) 1+ (*)     Nitrite, UA Positive (*)     All other components within normal limits    Narrative:     Specimen Source->Urine   C. TRACHOMATIS/N. GONORRHOEAE BY AMP DNA   URINALYSIS MICROSCOPIC    Narrative:     Specimen Source->Urine   POCT URINE PREGNANCY          Imaging Results    None          Medications   doxycycline tablet 100 mg (100 mg Oral Given 12/26/23 0117)   cefTRIAXone injection 500 mg (500 mg Intramuscular Given 12/26/23 0117)     Medical Decision Making  Initial assessment  38-year-old female presenting with 3 day history of bloody urine with vaginal itch and discharge. Patient is able to vocalise, breathing spontaneously,  hemodynamically stable, oriented, moving all 4 limbs spontaneously.  Examination concerning for cervical motion tenderness with white cervical discharge.      Differential diagnosis  Bacterial vaginosis  Cervicitis  Considered but lower suspicion for STI  UTI  Considered pyelonephritis    ED management  Workup consistent with leukocytes in urine with vaginal swab showing bacterial vaginosis.  Empiric treatment for chlamydia/gonorrhea given in the emergency department.  Patient discharged on doxycycline for UTI and metronidazole for bacterial vaginosis.  Return precautions provided including worsening dysuria or abdominal pain.      Amount and/or Complexity of Data Reviewed  Labs: ordered. Decision-making details documented in ED Course.    Risk  Prescription drug management.              Attending Attestation:   Physician Attestation Statement for Resident:  As the supervising MD   Physician Attestation Statement: I have personally seen and examined this patient.   I agree with the above history.  -:   As the supervising MD I agree with the above PE.     As the supervising MD I agree with the above treatment, course, plan, and disposition.    I have reviewed and agree with the residents interpretation of the following: lab data.  I have reviewed the following: old records at this facility.                ED Course as of 12/26/23 0214   Tue Dec 26, 2023   0044 NITRITE UA(!): Positive [PM]   0044 Preg Test, Ur: Negative [PM]   0044 RBC, UA: 1 [PM]   0212 Clue Cells, Wet Prep(!): Rare [PM]   0213 Trichomonas: None [PM]   0213 Budding Yeast: None [PM]   0213 Fungal Hyphae: None [PM]   0213 Vaginal Screen(!)  Bacterial vaginosis [PM]      ED Course User Index  [PM] Zachery Ordonez MD                           Clinical Impression:  Final diagnoses:  [N30.00] Acute cystitis without hematuria (Primary)  [N76.0, B96.89] Bacterial vaginosis          ED Disposition Condition    Discharge Stable          ED Prescriptions        Medication Sig Dispense Start Date End Date Auth. Provider    nitrofurantoin, macrocrystal-monohydrate, (MACROBID) 100 MG capsule  (Status: Discontinued) Take 1 capsule (100 mg total) by mouth 2 (two) times daily. for 5 days 10 capsule 12/26/2023 12/26/2023 Yadira Johnson MD    doxycycline (VIBRAMYCIN) 100 MG Cap Take 1 capsule (100 mg total) by mouth 2 (two) times daily. for 10 days 20 capsule 12/26/2023 1/5/2024 Yadira Johnson MD    metroNIDAZOLE (METROGEL) 0.75 % (37.5mg/5 gram) vaginal gel Place 1 applicator vaginally 2 (two) times daily. for 5 days 5 applicator 12/26/2023 12/31/2023 Yadira Johnson MD          Follow-up Information       Follow up With Specialties Details Why Contact Info Additional Information    Your primary care doctor  Schedule an appointment as soon as possible for a visit        [facility] [address] Urology Schedule an appointment as soon as possible for a visit   [id number]  [facility] [address] [phone] Main Building, 4th Floor Please park in [facility] and take Atrium elevator             Zachery Ordonez MD  Resident  12/26/23 0214       Yadira Johnson MD  12/26/23 022

## 2023-12-26 NOTE — DISCHARGE INSTRUCTIONS
Diagnosis: Urinary tract infection, bacterial vaginosis    Tests today showed:   Labs Reviewed   URINALYSIS, REFLEX TO URINE CULTURE - Abnormal; Notable for the following components:       Result Value    Bilirubin (UA) 1+ (*)     Nitrite, UA Positive (*)     All other components within normal limits    Narrative:     Specimen Source->Urine   URINALYSIS MICROSCOPIC    Narrative:     Specimen Source->Urine   POCT URINE PREGNANCY     Imaging Results    None         Treatments you had today:   Medications   nitrofurantoin (macrocrystal-monohydrate) 100 MG capsule 100 mg (has no administration in time range)   phenazopyridine tablet 200 mg (has no administration in time range)       Home Care Instructions:  - Take the prescribed antibiotic as directed  - Stay well hydrated  - For relief of burning and the feeling of urgency, take Azo over-the-counter according to packaging directions for up to two days. This medication may turn your urine yellow-orange, and may stain clothing.  - Continue taking your home medications as prescribed    Take ibuprofen (also called Advil, Motrin) for your pain. This medicine is available over-the-counter in 200 mg tablets.  - You may take 600 mg every 6 hours, or 800 mg every 8 hours as needed   - Do not take more than this amount, as it can cause kidney problems, bleeding in your stomach, and other serious problems.   - Do not also take naproxen (Aleve) at the same time or on the same day  - If you have heart problems or uncontrolled high blood pressure, you should not take ibuprofen for more than 3 days without discussing with your doctor    Follow-up plan:  - Follow-up with: Primary care doctor within 3 - 5 days  - Additional testing and/or evaluation as directed by your primary doctor    Return to the Emergency Department for symptoms including but not limited to: worsening symptoms, severe abdominal or back pain, shortness of breath or chest pain, vomiting with inability to hold down  fluids, fevers greater than 100.4°F, dizziness, passing out/fainting/unconsciousness, or other concerning symptoms.

## 2023-12-27 LAB
C TRACH DNA SPEC QL NAA+PROBE: NOT DETECTED
N GONORRHOEA DNA SPEC QL NAA+PROBE: NOT DETECTED

## (undated) DEVICE — PAD ABDOMINAL 5X9 STERILE